# Patient Record
Sex: FEMALE | Race: BLACK OR AFRICAN AMERICAN | NOT HISPANIC OR LATINO | URBAN - METROPOLITAN AREA
[De-identification: names, ages, dates, MRNs, and addresses within clinical notes are randomized per-mention and may not be internally consistent; named-entity substitution may affect disease eponyms.]

---

## 2019-03-11 ENCOUNTER — OUTPATIENT (OUTPATIENT)
Dept: OUTPATIENT SERVICES | Facility: HOSPITAL | Age: 84
LOS: 1 days | Discharge: HOME | End: 2019-03-11

## 2019-03-11 DIAGNOSIS — Z96.649 PRESENCE OF UNSPECIFIED ARTIFICIAL HIP JOINT: Chronic | ICD-10-CM

## 2019-03-11 LAB
ANION GAP SERPL CALC-SCNC: 12 MMO/L — SIGNIFICANT CHANGE UP (ref 7–14)
BUN SERPL-MCNC: 26 MG/DL — HIGH (ref 7–23)
CALCIUM SERPL-MCNC: 10.4 MG/DL — SIGNIFICANT CHANGE UP (ref 8.4–10.5)
CHLORIDE SERPL-SCNC: 105 MMOL/L — SIGNIFICANT CHANGE UP (ref 98–107)
CO2 SERPL-SCNC: 26 MMOL/L — SIGNIFICANT CHANGE UP (ref 22–31)
CREAT SERPL-MCNC: 1.28 MG/DL — SIGNIFICANT CHANGE UP (ref 0.5–1.3)
GLUCOSE SERPL-MCNC: 103 MG/DL — HIGH (ref 70–99)
HCT VFR BLD CALC: 34.4 % — LOW (ref 34.5–45)
HGB BLD-MCNC: 11.1 G/DL — LOW (ref 11.5–15.5)
MCHC RBC-ENTMCNC: 32.3 % — SIGNIFICANT CHANGE UP (ref 32–36)
MCHC RBC-ENTMCNC: 33.4 PG — SIGNIFICANT CHANGE UP (ref 27–34)
MCV RBC AUTO: 103.6 FL — HIGH (ref 80–100)
NRBC # FLD: 0 K/UL — LOW (ref 25–125)
PLATELET # BLD AUTO: 229 K/UL — SIGNIFICANT CHANGE UP (ref 150–400)
PMV BLD: 9.5 FL — SIGNIFICANT CHANGE UP (ref 7–13)
POTASSIUM SERPL-MCNC: 5 MMOL/L — SIGNIFICANT CHANGE UP (ref 3.5–5.3)
POTASSIUM SERPL-SCNC: 5 MMOL/L — SIGNIFICANT CHANGE UP (ref 3.5–5.3)
RBC # BLD: 3.32 M/UL — LOW (ref 3.8–5.2)
RBC # FLD: 13.2 % — SIGNIFICANT CHANGE UP (ref 10.3–14.5)
SODIUM SERPL-SCNC: 143 MMOL/L — SIGNIFICANT CHANGE UP (ref 135–145)
WBC # BLD: 5.38 K/UL — SIGNIFICANT CHANGE UP (ref 3.8–10.5)
WBC # FLD AUTO: 5.38 K/UL — SIGNIFICANT CHANGE UP (ref 3.8–10.5)

## 2019-03-11 RX ORDER — SODIUM CHLORIDE 9 MG/ML
3 INJECTION INTRAMUSCULAR; INTRAVENOUS; SUBCUTANEOUS EVERY 8 HOURS
Qty: 0 | Refills: 0 | Status: DISCONTINUED | OUTPATIENT
Start: 2019-03-11 | End: 2019-03-26

## 2019-03-11 NOTE — H&P CARDIOLOGY - PMH
CKD (chronic kidney disease) stage 3, GFR 30-59 ml/min    HTN (hypertension), benign    PVD (peripheral vascular disease)

## 2019-03-11 NOTE — H&P CARDIOLOGY - RS GEN PE MLT RESP DETAILS PC
good air movement/airway patent/no chest wall tenderness/respirations non-labored/clear to auscultation bilaterally/breath sounds equal

## 2019-03-11 NOTE — H&P CARDIOLOGY - HISTORY OF PRESENT ILLNESS
88 y/o F w/ PMH of HTN, Hypothyroidism, b/l lower extremities weakness (2013), right hip replacement (2013), prediabetes, CKD stage 3, CAD s/p stent, pHTN, Severe TR and PAD presents for left lower extremity angiogram 88 y/o F w/ PMH of HTN, HLD, Hypothyroidism, b/l lower extremities weakness (2013), right hip replacement (2013), prediabetes, CKD stage 3, CAD s/p stent, pHTN, Severe TR and PAD presents for left lower extremity angiogram. Pt states she experiences bilateral lower extremity pain and weakness which limits her activity. Pt has known bilateral lower extremity severe SFA PAD and is s/p failed intervention to left SFA. 86 y/o F w/ PMH of HTN, HLD, Hypothyroidism, b/l lower extremities weakness (2013), right hip replacement (2013), prediabetes, CKD stage 3, CAD s/p stent, pHTN, Severe TR and PAD presents for left lower extremity angiogram. Pt states she experiences bilateral lower extremity weakness which limits her activity. Pt has known bilateral severe SFA PAD and is s/p failed intervention to left SFA. Pt denies N/V/D, fevers, chills, cough, palpitations, chest pain, substernal distress, syncope, dyspnea on exertion, orthopnea, nocturnal paroxysmal dyspnea, edema, cyanosis, heart murmurs, varicosities, phlebitis. 86 y/o F w/ PMH of HTN, HLD, Hypothyroidism, b/l lower extremities weakness (2013), right hip replacement (2013), prediabetes, CKD stage 3, CAD s/p stent, pHTN, Severe TR and PAD presents for right lower extremity angiogram. Pt states she experiences bilateral lower extremity weakness which limits her activity. Pt has known bilateral severe SFA PAD and is s/p failed intervention to left SFA. Pt denies N/V/D, fevers, chills, cough, palpitations, chest pain, substernal distress, syncope, dyspnea on exertion, orthopnea, nocturnal paroxysmal dyspnea, edema, cyanosis, heart murmurs, varicosities, phlebitis.

## 2019-03-11 NOTE — H&P CARDIOLOGY - NEGATIVE CARDIOVASCULAR SYMPTOMS
no dyspnea on exertion/no orthopnea/no peripheral edema/no paroxysmal nocturnal dyspnea/no chest pain/no palpitations

## 2021-01-26 ENCOUNTER — INPATIENT (INPATIENT)
Facility: HOSPITAL | Age: 86
LOS: 0 days | Discharge: ANOTHER HOSPITAL | End: 2021-01-27
Attending: INTERNAL MEDICINE | Admitting: INTERNAL MEDICINE
Payer: MEDICARE

## 2021-01-26 VITALS
HEIGHT: 64 IN | DIASTOLIC BLOOD PRESSURE: 60 MMHG | OXYGEN SATURATION: 95 % | TEMPERATURE: 98 F | WEIGHT: 100.09 LBS | SYSTOLIC BLOOD PRESSURE: 135 MMHG | RESPIRATION RATE: 16 BRPM | HEART RATE: 76 BPM

## 2021-01-26 DIAGNOSIS — Z96.649 PRESENCE OF UNSPECIFIED ARTIFICIAL HIP JOINT: Chronic | ICD-10-CM

## 2021-01-26 LAB
ALBUMIN SERPL ELPH-MCNC: 2.9 G/DL — LOW (ref 3.3–5)
ALP SERPL-CCNC: 58 U/L — SIGNIFICANT CHANGE UP (ref 40–120)
ALT FLD-CCNC: 12 U/L — SIGNIFICANT CHANGE UP (ref 12–78)
ANION GAP SERPL CALC-SCNC: 15 MMOL/L — SIGNIFICANT CHANGE UP (ref 5–17)
APPEARANCE UR: CLEAR — SIGNIFICANT CHANGE UP
APTT BLD: 28.6 SEC — SIGNIFICANT CHANGE UP (ref 27.5–35.5)
AST SERPL-CCNC: 30 U/L — SIGNIFICANT CHANGE UP (ref 15–37)
BACTERIA # UR AUTO: ABNORMAL
BASOPHILS # BLD AUTO: 0 K/UL — SIGNIFICANT CHANGE UP (ref 0–0.2)
BASOPHILS NFR BLD AUTO: 0 % — SIGNIFICANT CHANGE UP (ref 0–2)
BILIRUB SERPL-MCNC: 0.7 MG/DL — SIGNIFICANT CHANGE UP (ref 0.2–1.2)
BILIRUB UR-MCNC: NEGATIVE — SIGNIFICANT CHANGE UP
BUN SERPL-MCNC: 45 MG/DL — HIGH (ref 7–23)
CALCIUM SERPL-MCNC: 9.4 MG/DL — SIGNIFICANT CHANGE UP (ref 8.5–10.1)
CHLORIDE SERPL-SCNC: 109 MMOL/L — HIGH (ref 96–108)
CK MB BLD-MCNC: 4.5 % — HIGH (ref 0–3.5)
CK MB CFR SERPL CALC: 3 NG/ML — SIGNIFICANT CHANGE UP (ref 0.5–3.6)
CK SERPL-CCNC: 66 U/L — SIGNIFICANT CHANGE UP (ref 26–192)
CO2 SERPL-SCNC: 19 MMOL/L — LOW (ref 22–31)
COLOR SPEC: YELLOW — SIGNIFICANT CHANGE UP
CREAT SERPL-MCNC: 1.46 MG/DL — HIGH (ref 0.5–1.3)
DIFF PNL FLD: ABNORMAL
EOSINOPHIL # BLD AUTO: 0 K/UL — SIGNIFICANT CHANGE UP (ref 0–0.5)
EOSINOPHIL NFR BLD AUTO: 0 % — SIGNIFICANT CHANGE UP (ref 0–6)
EPI CELLS # UR: SIGNIFICANT CHANGE UP
GLUCOSE SERPL-MCNC: 116 MG/DL — HIGH (ref 70–99)
GLUCOSE UR QL: NEGATIVE MG/DL — SIGNIFICANT CHANGE UP
HCT VFR BLD CALC: 33.3 % — LOW (ref 34.5–45)
HGB BLD-MCNC: 10.4 G/DL — LOW (ref 11.5–15.5)
IMM GRANULOCYTES NFR BLD AUTO: 0.2 % — SIGNIFICANT CHANGE UP (ref 0–1.5)
INR BLD: 1.22 RATIO — HIGH (ref 0.88–1.16)
KETONES UR-MCNC: ABNORMAL
LACTATE SERPL-SCNC: 1.9 MMOL/L — SIGNIFICANT CHANGE UP (ref 0.7–2)
LACTATE SERPL-SCNC: 2.4 MMOL/L — HIGH (ref 0.7–2)
LEUKOCYTE ESTERASE UR-ACNC: ABNORMAL
LYMPHOCYTES # BLD AUTO: 0.36 K/UL — LOW (ref 1–3.3)
LYMPHOCYTES # BLD AUTO: 5.8 % — LOW (ref 13–44)
MCHC RBC-ENTMCNC: 31.2 GM/DL — LOW (ref 32–36)
MCHC RBC-ENTMCNC: 31.7 PG — SIGNIFICANT CHANGE UP (ref 27–34)
MCV RBC AUTO: 101.5 FL — HIGH (ref 80–100)
MONOCYTES # BLD AUTO: 0.25 K/UL — SIGNIFICANT CHANGE UP (ref 0–0.9)
MONOCYTES NFR BLD AUTO: 4 % — SIGNIFICANT CHANGE UP (ref 2–14)
NEUTROPHILS # BLD AUTO: 5.64 K/UL — SIGNIFICANT CHANGE UP (ref 1.8–7.4)
NEUTROPHILS NFR BLD AUTO: 90 % — HIGH (ref 43–77)
NITRITE UR-MCNC: NEGATIVE — SIGNIFICANT CHANGE UP
NRBC # BLD: 0 /100 WBCS — SIGNIFICANT CHANGE UP (ref 0–0)
PH UR: 5 — SIGNIFICANT CHANGE UP (ref 5–8)
PLATELET # BLD AUTO: 275 K/UL — SIGNIFICANT CHANGE UP (ref 150–400)
POTASSIUM SERPL-MCNC: 4.6 MMOL/L — SIGNIFICANT CHANGE UP (ref 3.5–5.3)
POTASSIUM SERPL-SCNC: 4.6 MMOL/L — SIGNIFICANT CHANGE UP (ref 3.5–5.3)
PROT SERPL-MCNC: 7.9 GM/DL — SIGNIFICANT CHANGE UP (ref 6–8.3)
PROT UR-MCNC: 30 MG/DL
PROTHROM AB SERPL-ACNC: 14 SEC — HIGH (ref 10.6–13.6)
RBC # BLD: 3.28 M/UL — LOW (ref 3.8–5.2)
RBC # FLD: 14.7 % — HIGH (ref 10.3–14.5)
RBC CASTS # UR COMP ASSIST: SIGNIFICANT CHANGE UP /HPF (ref 0–4)
SODIUM SERPL-SCNC: 143 MMOL/L — SIGNIFICANT CHANGE UP (ref 135–145)
SP GR SPEC: 1.02 — SIGNIFICANT CHANGE UP (ref 1.01–1.02)
TROPONIN I SERPL-MCNC: <.015 NG/ML — SIGNIFICANT CHANGE UP (ref 0.01–0.04)
TSH SERPL-MCNC: 8.63 UIU/ML — HIGH (ref 0.36–3.74)
UROBILINOGEN FLD QL: NEGATIVE MG/DL — SIGNIFICANT CHANGE UP
WBC # BLD: 6.26 K/UL — SIGNIFICANT CHANGE UP (ref 3.8–10.5)
WBC # FLD AUTO: 6.26 K/UL — SIGNIFICANT CHANGE UP (ref 3.8–10.5)
WBC UR QL: SIGNIFICANT CHANGE UP

## 2021-01-26 PROCEDURE — 99284 EMERGENCY DEPT VISIT MOD MDM: CPT

## 2021-01-26 PROCEDURE — 71045 X-RAY EXAM CHEST 1 VIEW: CPT | Mod: 26

## 2021-01-26 PROCEDURE — 70450 CT HEAD/BRAIN W/O DYE: CPT | Mod: 26,MA

## 2021-01-26 PROCEDURE — 74177 CT ABD & PELVIS W/CONTRAST: CPT | Mod: 26,MA

## 2021-01-26 PROCEDURE — 71250 CT THORAX DX C-: CPT | Mod: 26,MA

## 2021-01-26 RX ORDER — SODIUM CHLORIDE 9 MG/ML
1000 INJECTION INTRAMUSCULAR; INTRAVENOUS; SUBCUTANEOUS ONCE
Refills: 0 | Status: COMPLETED | OUTPATIENT
Start: 2021-01-26 | End: 2021-01-26

## 2021-01-26 RX ORDER — SODIUM CHLORIDE 9 MG/ML
500 INJECTION INTRAMUSCULAR; INTRAVENOUS; SUBCUTANEOUS ONCE
Refills: 0 | Status: COMPLETED | OUTPATIENT
Start: 2021-01-26 | End: 2021-01-26

## 2021-01-26 RX ADMIN — SODIUM CHLORIDE 500 MILLILITER(S): 9 INJECTION INTRAMUSCULAR; INTRAVENOUS; SUBCUTANEOUS at 22:16

## 2021-01-26 RX ADMIN — SODIUM CHLORIDE 1000 MILLILITER(S): 9 INJECTION INTRAMUSCULAR; INTRAVENOUS; SUBCUTANEOUS at 20:57

## 2021-01-26 NOTE — ED PROVIDER NOTE - CONSTITUTIONAL, MLM
normal... thin, cachetic appearing awake, alert, oriented to person, place, time/situation and in no apparent distress.

## 2021-01-26 NOTE — ED PROVIDER NOTE - SECONDARY DIAGNOSIS.
COVID-19 Failure to thrive in adult Urinary tract infection without hematuria, site unspecified Weakness

## 2021-01-26 NOTE — ED PROVIDER NOTE - NS ED MD DISPO SPECIAL CONSIDERATION1
Fall Precaution/Advanced Illness/Geriatrics/Frailty/Confirmed COVID-19 Fall Precaution/Advanced Illness/Geriatrics/Frailty

## 2021-01-26 NOTE — ED PROVIDER NOTE - CARE PLAN
Principal Discharge DX:	Mucinous adenocarcinoma  Secondary Diagnosis:	Urinary tract infection without hematuria, site unspecified  Secondary Diagnosis:	Weakness  Secondary Diagnosis:	Failure to thrive in adult  Secondary Diagnosis:	COVID-19   Principal Discharge DX:	Mucinous adenocarcinoma  Secondary Diagnosis:	Urinary tract infection without hematuria, site unspecified  Secondary Diagnosis:	Weakness  Secondary Diagnosis:	Failure to thrive in adult

## 2021-01-26 NOTE — ED PROVIDER NOTE - CADM POA PRESS ULCER
Subjective:    Patient ID:  Kamila Dobbs is a 68 y.o. female who presents for follow-up of Results      HPI     HTN,HLD,DM, palpitations, Mild-mod MR/AI     Echo 11/16/17    1 - Normal left ventricular systolic function (EF 55-60%).     2 - Mild to moderate mitral regurgitation.     3 - Mild AI and mild TR     Stress test 11/16/17  LVEF: 66 %  Impression: NORMAL MYOCARDIAL PERFUSION  1. The perfusion scan is free of evidence for myocardial ischemia or injury.   2. There is a mild intensity fixed defect in the anterior wall of the left ventricle, secondary to breast attenuation.   3. Resting wall motion is physiologic.   4. Resting LV function is normal.   5. The ventricular volumes are normal at rest and stress.   6. The extracardiac distribution of radioactivity is normal.   7. When compared to the previous study from 09/16/2015, no significant change.    Denies CP or SOB       Review of Systems   Constitution: Negative for decreased appetite.   HENT: Negative for ear discharge.    Eyes: Negative for blurred vision.   Respiratory: Negative for hemoptysis.    Endocrine: Negative for polyphagia.   Hematologic/Lymphatic: Negative for adenopathy.   Skin: Negative for color change.   Musculoskeletal: Negative for joint swelling.   Neurological: Negative for brief paralysis.   Psychiatric/Behavioral: Negative for hallucinations.        Objective:    Physical Exam   Constitutional: She is oriented to person, place, and time. She appears well-developed and well-nourished.   HENT:   Head: Normocephalic and atraumatic.   Eyes: Conjunctivae are normal. Pupils are equal, round, and reactive to light.   Neck: Normal range of motion. Neck supple.   Cardiovascular: Normal rate, normal heart sounds and intact distal pulses.    Pulmonary/Chest: Effort normal and breath sounds normal.   Abdominal: Soft. Bowel sounds are normal.   Musculoskeletal: Normal range of motion.   Neurological: She is alert and oriented to person,  place, and time.   Skin: Skin is warm and dry.         Assessment:       1. Chest pain, atypical    2. CANAS (dyspnea on exertion)    3. Dizziness    4. Diabetic nephropathy associated with type 2 diabetes mellitus    5. HTN (hypertension), benign    6. Hyperlipidemia, unspecified hyperlipidemia type    7. Palpitations    8. Mitral valve insufficiency, unspecified etiology         Plan:       Cardiac stable  OV 6 months             No

## 2021-01-26 NOTE — ED ADULT NURSE NOTE - OBJECTIVE STATEMENT
pt a&O x3  pt poor historian, pt c.o left leg weakness x 4 weeks H/O DVT 2019 . pt states use cane , no recent falls. PT states poor appetite. NO pain on urination, no dizziness, chest pain, no shortness of breath. pt sounds mumbled slurred speech, left hand grasp greater. no arm drift, no arm weakness, no leg drift

## 2021-01-26 NOTE — ED PROVIDER NOTE - PROGRESS NOTE DETAILS
signout received: NYLA BUCKNER: 88 y/o F w/ PMH of HTN, HLD, Hypothyroidism, b/l lower extremities weakness (2013), right hip replacement (2013), prediabetes, CKD stage 3, CAD s/p stent, pHTN, Severe TR and PAD presenting with weakness, to be admitted for CT AP (+) widespread carcinomatosis, generalized weakness, (+) UTI, mild KHLOE (no baseline in EMR), lactate 2.4 which cleared, elevated TSH 8.63 (hx of hypothyroidism, probably undertreated?). CTH negative, CT chest w/ small b/l pleural effusions.

## 2021-01-26 NOTE — ED PROVIDER NOTE - OBJECTIVE STATEMENT
89 year old female with h/o HTN, CKD and PVD presents today c/o bilateral lower extremity weakness chronic for the last four weeks, she describes her pain as intermittent and cramping and worsening  (-) traumm (-) leg edema (-) like illness +weight loss

## 2021-01-26 NOTE — ED PROVIDER NOTE - CLINICAL SUMMARY MEDICAL DECISION MAKING FREE TEXT BOX
pt presented today with leg pains and weakness, also reports weight loss, appears thin and cachetic, lab, ekg, ct pending, case signed out to the oncoming physician

## 2021-01-26 NOTE — ED ADULT NURSE REASSESSMENT NOTE - NS ED NURSE REASSESS COMMENT FT1
Received report from DANIELLA Portillo. pt on the bed alert and oriented. pt identified self introduced. hooked to cardiac monitor. safety measures checked.

## 2021-01-27 ENCOUNTER — INPATIENT (INPATIENT)
Facility: HOSPITAL | Age: 86
LOS: 2 days | Discharge: HOME HEALTH | DRG: 754 | End: 2021-01-30
Attending: INTERNAL MEDICINE | Admitting: INTERNAL MEDICINE
Payer: COMMERCIAL

## 2021-01-27 VITALS
HEIGHT: 62 IN | SYSTOLIC BLOOD PRESSURE: 131 MMHG | DIASTOLIC BLOOD PRESSURE: 79 MMHG | OXYGEN SATURATION: 99 % | HEART RATE: 66 BPM | TEMPERATURE: 99 F | WEIGHT: 101.41 LBS | RESPIRATION RATE: 14 BRPM

## 2021-01-27 VITALS
OXYGEN SATURATION: 100 % | DIASTOLIC BLOOD PRESSURE: 71 MMHG | HEART RATE: 81 BPM | RESPIRATION RATE: 17 BRPM | SYSTOLIC BLOOD PRESSURE: 128 MMHG | TEMPERATURE: 98 F

## 2021-01-27 DIAGNOSIS — Z29.9 ENCOUNTER FOR PROPHYLACTIC MEASURES, UNSPECIFIED: ICD-10-CM

## 2021-01-27 DIAGNOSIS — C80.1 MALIGNANT (PRIMARY) NEOPLASM, UNSPECIFIED: ICD-10-CM

## 2021-01-27 DIAGNOSIS — R73.03 PREDIABETES: ICD-10-CM

## 2021-01-27 DIAGNOSIS — E03.9 HYPOTHYROIDISM, UNSPECIFIED: ICD-10-CM

## 2021-01-27 DIAGNOSIS — R62.7 ADULT FAILURE TO THRIVE: ICD-10-CM

## 2021-01-27 DIAGNOSIS — N18.30 CHRONIC KIDNEY DISEASE, STAGE 3 UNSPECIFIED: ICD-10-CM

## 2021-01-27 DIAGNOSIS — D53.9 NUTRITIONAL ANEMIA, UNSPECIFIED: ICD-10-CM

## 2021-01-27 DIAGNOSIS — R19.00 INTRA-ABDOMINAL AND PELVIC SWELLING, MASS AND LUMP, UNSPECIFIED SITE: ICD-10-CM

## 2021-01-27 DIAGNOSIS — Z96.649 PRESENCE OF UNSPECIFIED ARTIFICIAL HIP JOINT: Chronic | ICD-10-CM

## 2021-01-27 DIAGNOSIS — I10 ESSENTIAL (PRIMARY) HYPERTENSION: ICD-10-CM

## 2021-01-27 DIAGNOSIS — R53.1 WEAKNESS: ICD-10-CM

## 2021-01-27 DIAGNOSIS — N39.0 URINARY TRACT INFECTION, SITE NOT SPECIFIED: ICD-10-CM

## 2021-01-27 DIAGNOSIS — N28.9 DISORDER OF KIDNEY AND URETER, UNSPECIFIED: ICD-10-CM

## 2021-01-27 DIAGNOSIS — I73.9 PERIPHERAL VASCULAR DISEASE, UNSPECIFIED: ICD-10-CM

## 2021-01-27 LAB
ALBUMIN SERPL ELPH-MCNC: 2.8 G/DL — LOW (ref 3.3–5)
ALP SERPL-CCNC: 54 U/L — SIGNIFICANT CHANGE UP (ref 40–120)
ALT FLD-CCNC: 10 U/L — LOW (ref 12–78)
ANION GAP SERPL CALC-SCNC: 9 MMOL/L — SIGNIFICANT CHANGE UP (ref 5–17)
AST SERPL-CCNC: 33 U/L — SIGNIFICANT CHANGE UP (ref 15–37)
BASOPHILS # BLD AUTO: 0 K/UL — SIGNIFICANT CHANGE UP (ref 0–0.2)
BASOPHILS NFR BLD AUTO: 0 % — SIGNIFICANT CHANGE UP (ref 0–2)
BILIRUB SERPL-MCNC: 0.5 MG/DL — SIGNIFICANT CHANGE UP (ref 0.2–1.2)
BUN SERPL-MCNC: 42 MG/DL — HIGH (ref 7–23)
CALCIUM SERPL-MCNC: 9 MG/DL — SIGNIFICANT CHANGE UP (ref 8.5–10.1)
CHLORIDE SERPL-SCNC: 114 MMOL/L — HIGH (ref 96–108)
CO2 SERPL-SCNC: 21 MMOL/L — LOW (ref 22–31)
CREAT SERPL-MCNC: 1.36 MG/DL — HIGH (ref 0.5–1.3)
EOSINOPHIL # BLD AUTO: 0 K/UL — SIGNIFICANT CHANGE UP (ref 0–0.5)
EOSINOPHIL NFR BLD AUTO: 0 % — SIGNIFICANT CHANGE UP (ref 0–6)
FERRITIN SERPL-MCNC: 812 NG/ML — HIGH (ref 15–150)
FOLATE SERPL-MCNC: 13.9 NG/ML — SIGNIFICANT CHANGE UP
GLUCOSE SERPL-MCNC: 100 MG/DL — HIGH (ref 70–99)
HCT VFR BLD CALC: 33.7 % — LOW (ref 34.5–45)
HGB BLD-MCNC: 10.8 G/DL — LOW (ref 11.5–15.5)
IMM GRANULOCYTES NFR BLD AUTO: 0.3 % — SIGNIFICANT CHANGE UP (ref 0–1.5)
IRON SATN MFR SERPL: 18 % — SIGNIFICANT CHANGE UP (ref 14–50)
IRON SATN MFR SERPL: 29 UG/DL — LOW (ref 30–160)
LYMPHOCYTES # BLD AUTO: 0.73 K/UL — LOW (ref 1–3.3)
LYMPHOCYTES # BLD AUTO: 12.6 % — LOW (ref 13–44)
MAGNESIUM SERPL-MCNC: 2.4 MG/DL — SIGNIFICANT CHANGE UP (ref 1.6–2.6)
MCHC RBC-ENTMCNC: 32 GM/DL — SIGNIFICANT CHANGE UP (ref 32–36)
MCHC RBC-ENTMCNC: 32.4 PG — SIGNIFICANT CHANGE UP (ref 27–34)
MCV RBC AUTO: 101.2 FL — HIGH (ref 80–100)
MONOCYTES # BLD AUTO: 0.25 K/UL — SIGNIFICANT CHANGE UP (ref 0–0.9)
MONOCYTES NFR BLD AUTO: 4.3 % — SIGNIFICANT CHANGE UP (ref 2–14)
NEUTROPHILS # BLD AUTO: 4.78 K/UL — SIGNIFICANT CHANGE UP (ref 1.8–7.4)
NEUTROPHILS NFR BLD AUTO: 82.8 % — HIGH (ref 43–77)
NRBC # BLD: 0 /100 WBCS — SIGNIFICANT CHANGE UP (ref 0–0)
PHOSPHATE SERPL-MCNC: 3.6 MG/DL — SIGNIFICANT CHANGE UP (ref 2.5–4.5)
PLATELET # BLD AUTO: 273 K/UL — SIGNIFICANT CHANGE UP (ref 150–400)
POTASSIUM SERPL-MCNC: 4.4 MMOL/L — SIGNIFICANT CHANGE UP (ref 3.5–5.3)
POTASSIUM SERPL-SCNC: 4.4 MMOL/L — SIGNIFICANT CHANGE UP (ref 3.5–5.3)
PROT SERPL-MCNC: 7.8 GM/DL — SIGNIFICANT CHANGE UP (ref 6–8.3)
RAPID RVP RESULT: SIGNIFICANT CHANGE UP
RBC # BLD: 3.33 M/UL — LOW (ref 3.8–5.2)
RBC # FLD: 14.9 % — HIGH (ref 10.3–14.5)
SARS-COV-2 IGG SERPL QL IA: NEGATIVE — SIGNIFICANT CHANGE UP
SARS-COV-2 IGM SERPL IA-ACNC: 0.08 INDEX — SIGNIFICANT CHANGE UP
SARS-COV-2 RNA SPEC QL NAA+PROBE: SIGNIFICANT CHANGE UP
SODIUM SERPL-SCNC: 144 MMOL/L — SIGNIFICANT CHANGE UP (ref 135–145)
T4 FREE SERPL-MCNC: 0.9 NG/DL — SIGNIFICANT CHANGE UP (ref 0.9–1.8)
TIBC SERPL-MCNC: 158 UG/DL — LOW (ref 220–430)
UIBC SERPL-MCNC: 128 UG/DL — SIGNIFICANT CHANGE UP (ref 110–370)
VIT B12 SERPL-MCNC: >2000 PG/ML — HIGH (ref 232–1245)
WBC # BLD: 5.78 K/UL — SIGNIFICANT CHANGE UP (ref 3.8–10.5)
WBC # FLD AUTO: 5.78 K/UL — SIGNIFICANT CHANGE UP (ref 3.8–10.5)

## 2021-01-27 PROCEDURE — 12345: CPT | Mod: NC

## 2021-01-27 PROCEDURE — 99223 1ST HOSP IP/OBS HIGH 75: CPT

## 2021-01-27 RX ORDER — ONDANSETRON 8 MG/1
4 TABLET, FILM COATED ORAL EVERY 6 HOURS
Refills: 0 | Status: DISCONTINUED | OUTPATIENT
Start: 2021-01-27 | End: 2021-01-27

## 2021-01-27 RX ORDER — SODIUM CHLORIDE 9 MG/ML
1000 INJECTION, SOLUTION INTRAVENOUS
Refills: 0 | Status: DISCONTINUED | OUTPATIENT
Start: 2021-01-27 | End: 2021-01-29

## 2021-01-27 RX ORDER — ACETAMINOPHEN 500 MG
650 TABLET ORAL EVERY 6 HOURS
Refills: 0 | Status: DISCONTINUED | OUTPATIENT
Start: 2021-01-27 | End: 2021-01-27

## 2021-01-27 RX ORDER — PREGABALIN 225 MG/1
0 CAPSULE ORAL
Qty: 0 | Refills: 9 | DISCHARGE

## 2021-01-27 RX ORDER — LEVOTHYROXINE SODIUM 125 MCG
25 TABLET ORAL DAILY
Refills: 0 | Status: DISCONTINUED | OUTPATIENT
Start: 2021-01-27 | End: 2021-01-30

## 2021-01-27 RX ORDER — CEFTRIAXONE 500 MG/1
1000 INJECTION, POWDER, FOR SOLUTION INTRAMUSCULAR; INTRAVENOUS ONCE
Refills: 0 | Status: COMPLETED | OUTPATIENT
Start: 2021-01-27 | End: 2021-01-27

## 2021-01-27 RX ORDER — ASPIRIN/CALCIUM CARB/MAGNESIUM 324 MG
81 TABLET ORAL DAILY
Refills: 0 | Status: DISCONTINUED | OUTPATIENT
Start: 2021-01-27 | End: 2021-01-27

## 2021-01-27 RX ORDER — LEVOTHYROXINE SODIUM 125 MCG
25 TABLET ORAL DAILY
Refills: 0 | Status: DISCONTINUED | OUTPATIENT
Start: 2021-01-27 | End: 2021-01-27

## 2021-01-27 RX ORDER — CEFTRIAXONE 500 MG/1
1000 INJECTION, POWDER, FOR SOLUTION INTRAMUSCULAR; INTRAVENOUS EVERY 24 HOURS
Refills: 0 | Status: DISCONTINUED | OUTPATIENT
Start: 2021-01-27 | End: 2021-01-27

## 2021-01-27 RX ORDER — AMLODIPINE BESYLATE 2.5 MG/1
5 TABLET ORAL DAILY
Refills: 0 | Status: DISCONTINUED | OUTPATIENT
Start: 2021-01-27 | End: 2021-01-30

## 2021-01-27 RX ORDER — SIMVASTATIN 20 MG/1
1 TABLET, FILM COATED ORAL
Qty: 0 | Refills: 0 | DISCHARGE

## 2021-01-27 RX ORDER — AMLODIPINE BESYLATE 2.5 MG/1
5 TABLET ORAL DAILY
Refills: 0 | Status: DISCONTINUED | OUTPATIENT
Start: 2021-01-27 | End: 2021-01-27

## 2021-01-27 RX ORDER — ASPIRIN/CALCIUM CARB/MAGNESIUM 324 MG
81 TABLET ORAL DAILY
Refills: 0 | Status: DISCONTINUED | OUTPATIENT
Start: 2021-01-27 | End: 2021-01-30

## 2021-01-27 RX ORDER — HEPARIN SODIUM 5000 [USP'U]/ML
5000 INJECTION INTRAVENOUS; SUBCUTANEOUS THREE TIMES A DAY
Refills: 0 | Status: DISCONTINUED | OUTPATIENT
Start: 2021-01-27 | End: 2021-01-27

## 2021-01-27 RX ORDER — HEPARIN SODIUM 5000 [USP'U]/ML
5000 INJECTION INTRAVENOUS; SUBCUTANEOUS EVERY 12 HOURS
Refills: 0 | Status: DISCONTINUED | OUTPATIENT
Start: 2021-01-27 | End: 2021-01-30

## 2021-01-27 RX ADMIN — AMLODIPINE BESYLATE 5 MILLIGRAM(S): 2.5 TABLET ORAL at 15:54

## 2021-01-27 RX ADMIN — Medication 81 MILLIGRAM(S): at 15:54

## 2021-01-27 RX ADMIN — HEPARIN SODIUM 5000 UNIT(S): 5000 INJECTION INTRAVENOUS; SUBCUTANEOUS at 15:54

## 2021-01-27 RX ADMIN — CEFTRIAXONE 100 MILLIGRAM(S): 500 INJECTION, POWDER, FOR SOLUTION INTRAMUSCULAR; INTRAVENOUS at 01:21

## 2021-01-27 NOTE — H&P ADULT - ASSESSMENT
89 y/o F with PMH of HTN, Dyslipidemia, Pre DM, CAD s/p PCI, PVD, DVT in 2019, CKD stage 3, and Hypothyroidism presented with a newly diagnosed pelvic mass.

## 2021-01-27 NOTE — H&P ADULT - PROBLEM SELECTOR PLAN 7
TSH was 8.63 on January 26th as patient was not taking her Levothyroxine, and was just restarted on 25 mcg PO daily on admission, continue same dose, needs a f/u  TSH in 3 months as an outpatient.

## 2021-01-27 NOTE — H&P ADULT - PROBLEM SELECTOR PLAN 1
patient is aware of the mass, agreed to have paracentesis, scheduled for am, ordered  & CEA, please discuss with her daughter in [place] in am, may consider palliative care consult.

## 2021-01-27 NOTE — H&P ADULT - NSHPPHYSICALEXAM_GEN_ALL_CORE
PHYSICAL EXAM:    Vital Signs Last 24 Hrs  T(C): 36.7 (27 Jan 2021 02:11), Max: 36.8 (26 Jan 2021 22:36)  T(F): 98 (27 Jan 2021 02:11), Max: 98.2 (26 Jan 2021 22:36)  HR: 98 (27 Jan 2021 02:11) (76 - 98)  BP: 123/63 (27 Jan 2021 02:11) (123/63 - 141/100)  BP(mean): --  RR: 16 (27 Jan 2021 02:11) (16 - 18)  SpO2: 99% (27 Jan 2021 02:11) (95% - 99%)    GENERAL: Pt lying in bed comfortably in NAD  HEENT:  Atraumatic, EOMI, PERRL, conjunctiva and sclera clear, MMM  NECK: Supple, No JVD  CHEST/LUNG: Clear to auscultation bilaterally; No rales, rhonchi, wheezing or rubs. Unlabored respirations  HEART: Regular rate and rhythm; No murmurs, rubs, or gallops  ABDOMEN: Bowel sounds present; Soft, Nontender, Nondistended. No guarding or rigidity    EXTREMITIES:  2+ Peripheral Pulses, brisk capillary refill. No clubbing, cyanosis, or edema  NEUROLOGICAL:  Alert & Oriented X3, speech clear. Answers questions appropriately. Full and equal strength B/L upper and lower extremities. No deficits   MSK: FROM x 4 extremities   SKIN: No rashes or lesions PHYSICAL EXAM:    Vital Signs Last 24 Hrs  T(C): 36.7 (27 Jan 2021 02:11), Max: 36.8 (26 Jan 2021 22:36)  T(F): 98 (27 Jan 2021 02:11), Max: 98.2 (26 Jan 2021 22:36)  HR: 98 (27 Jan 2021 02:11) (76 - 98)  BP: 123/63 (27 Jan 2021 02:11) (123/63 - 141/100)  BP(mean): --  RR: 16 (27 Jan 2021 02:11) (16 - 18)  SpO2: 99% (27 Jan 2021 02:11) (95% - 99%)    GENERAL: Pt lying in bed comfortably, appears malnurished, thin/cachectic  HEENT:  Atraumatic, EOMI, PERRL, conjunctiva and sclera clear, dry MM  NECK: Supple  CHEST/LUNG: Clear to auscultation bilaterally Unlabored respirations  HEART: Regular rate and rhythm;  ABDOMEN: Bowel sounds present; Soft, Nontender, mid-mod distended. No guarding or rigidity    EXTREMITIES:  1+ Peripheral Pulses. No edema  NEUROLOGICAL:  Alert & Oriented X3, speech clear. Generalized weakness No focal deficits   MSK: FROM x 4 extremities   SKIN: No rashes or lesions

## 2021-01-27 NOTE — H&P ADULT - PROBLEM SELECTOR PLAN 4
- pt w/ worsening weakness and pain, DPs mildly diminished but present  - PT eval  - tylenol prn  - ASA

## 2021-01-27 NOTE — H&P ADULT - HISTORY OF PRESENT ILLNESS
This is an 89 y/o F with PMH of HTN, Dyslipidemia, Pre DM, CAD s/p PCI, PVD, DVT in 2019, CKD stage 3, and Hypothyroidism who was transferred from [facility] after she was admitted there for a newly diagnosed pelvic mass. Patient was experiencing anorexia & ongoing weight loss over the past 2 months, seen yesterday at [facility] for left leg pain & generalized weakness, CT abdomen & pelvis with contrast showed a large pelvic mass with extensive peritoneal carcinomatosis and large amount of ascites, seen by HEM/ONC there & was offered IR biopsy but she was still thinking, now she agrees to have it done, was transferred to [facility]. Currently she reports no pain, and denies having any symptoms.   This is an 89 y/o F with PMH of HTN, Dyslipidemia, Pre DM, CAD s/p PCI, PVD, DVT in 2019, CKD stage 3, and Hypothyroidism who was transferred from [facility] after she was admitted there for a newly diagnosed pelvic mass. Patient was experiencing anorexia & ongoing weight loss over the past 2 months, seen yesterday at [facility] for left leg pain & generalized weakness, CT abdomen & pelvis with contrast showed a large pelvic mass with extensive peritoneal carcinomatosis and large amount of ascites, seen by HEM/ONC there & was offered IR paracentesis but she was still thinking, now she agrees to have it done, was transferred to [facility]. Currently she reports no pain, and denies having any symptoms.

## 2021-01-27 NOTE — CONSULT NOTE ADULT - SUBJECTIVE AND OBJECTIVE BOX
Reason for Consultation: pelvic Mass    HPI: Patient is a 89y Female seen on consultatioin for the evaluation and management of Pelvic Mass            89 y/o female with PMHx of HTN, Hypothyroidism, chronic B/L lower extremities weakness (R>>L), s/p right hip replacement in , CKD stage 3, PVD s/p ? bypass/stent, CAD s/p 1 stent presents to the ED c/p worsening LE weakness (R>>L) for the past few weeks. Pt reports a "blockage" was found in her LE B/L, however L>>R, and is s/p surgery however pain has persisted intermittently and has worsened in the last few weeks. Pt describes pain as crampy, noted more in am after waking up. Pt also c/o generalized weakness for the past few weeks, ongoing unintentional wt loss for the past few months, increasingly "puffy" abdomen for the past few 3 weeks as well as loss of appetite, and decreased PO intake. Pt reports she reported her wt loss to PCP and was PCP suspected malignancy however no work up was done per pt, her medications were adjusted. Pt reports she was taken off all her medications except for ASA and Amlodipine. Pt reported a bout of nonbloody diarrhea a few weeks which resolved on its own however denies, N/V, or abdominal pain. Pt also reports change in color of her urine; darker, sediments in the last 2 weeks. Pt denies fever, chills, night sweats, or vaginal bleeding or hx of malignancy (was up to date on cancer screening until she aged-out)    In ED initial vitals stable. Labs sig for H/H 10.4/33.5, .5, LA 2.4, TSH 8.6, BUN/Cr 45/1.46, for rest of labs see below. UA +ve bacteriuria, CT A/P showed Very large infiltrative cystic and solid mass in the pelvis, with extensive peritoneal carcinomatosis and large volume diffuse ascites. 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 2. Small bilateral pleural effusions. Pt given Rocephin and IVF.       PAST MEDICAL & SURGICAL HISTORY:  PVD (peripheral vascular disease)    CKD (chronic kidney disease) stage 3, GFR 30-59 ml/min    HTN (hypertension), benign    Status post hip replacement, unspecified laterality      MEDICATIONS  (STANDING):  amLODIPine   Tablet 5 milliGRAM(s) Oral daily  aspirin enteric coated 81 milliGRAM(s) Oral daily  cefTRIAXone   IVPB 1000 milliGRAM(s) IV Intermittent every 24 hours  heparin   Injectable 5000 Unit(s) SubCutaneous three times a day  levothyroxine 25 MICROGram(s) Oral daily    MEDICATIONS  (PRN):  acetaminophen   Tablet .. 650 milliGRAM(s) Oral every 6 hours PRN Temp greater or equal to 38C (100.4F), Mild Pain (1 - 3)  ondansetron Injectable 4 milliGRAM(s) IV Push every 6 hours PRN Nausea and/or Vomiting      Allergies    No Known Allergies    Intolerances        SOCIAL HISTORY:    Smoking Status: no  Alcohol: no  Marital Status: widowded  Occupation: no    FAMILY HISTORY:        	    Vital Signs Last 24 Hrs  T(C): 36.7 (2021 06:25), Max: 36.8 (2021 22:36)  T(F): 98.1 (2021 06:25), Max: 98.2 (2021 22:36)  HR: 99 (2021 06:25) (76 - 99)  BP: 118/72 (2021 06:25) (118/72 - 141/100)  BP(mean): --  RR: 16 (2021 06:25) (16 - 18)  SpO2: 98% (2021 06:25) (95% - 99%)    PHYSICAL EXAM:    general - AAO x 3, cachetic  HEENT - No Icterus  CVS - RRR  RS - AE B/L  Abd - soft, NT  Ext - Pulses +        LABS:                        10.4   6.26  )-----------( 275      ( 2021 20:03 )             33.3         143  |  109<H>  |  45<H>  ----------------------------<  116<H>  4.6   |  19<L>  |  1.46<H>    Ca    9.4      2021 20:03    TPro  7.9  /  Alb  2.9<L>  /  TBili  0.7  /  DBili  x   /  AST  30  /  ALT  12  /  AlkPhos  58      PT/INR - ( 2021 20:03 )   PT: 14.0 sec;   INR: 1.22 ratio         PTT - ( 2021 20:03 )  PTT:28.6 sec  Urinalysis Basic - ( 2021 22:35 )    Color: Yellow / Appearance: Clear / S.020 / pH: x  Gluc: x / Ketone: Small  / Bili: Negative / Urobili: Negative mg/dL   Blood: x / Protein: 30 mg/dL / Nitrite: Negative   Leuk Esterase: Trace / RBC: 0-2 /HPF / WBC 3-5   Sq Epi: x / Non Sq Epi: Few / Bacteria: TNTC          RADIOLOGY & ADDITIONAL STUDIES:

## 2021-01-27 NOTE — PATIENT PROFILE ADULT - NSPROPTRIGHTBILLOFRIGHTS_GEN_A_NUR
Physical Therapy Daily Treatment    Visit Count: 14  Plan of Care: 1/11/2019 Through: 4/5/2019  Insurance Information: \"evaluation then 12 visits\" approved - Authorization required for any additional visits. \"Does not need authorization for up to 20 visits.\" - $75 co-pay   Referred by: Tejinder Foster MD; Next provider visit (if known/scheduled): 4/16/19  Medical Diagnosis (from order):  Decreased mobility [R26.89], History of stroke [Z86.73]     Date of onset/injury: 10/5/2018  Diagnosis Precautions: Expressive aphasia, thickened liquids, fearful of falling, use gait belt  Chart reviewed at time of initial evaluation (relevant co-morbidities, allergies, tests and medications listed): Cerebral aneurysm, expressive aphasia, History of left greater trochanteric bursitis, form smoker (up until onset of Cerebrovascular Accident)  History of Cerebrovascular Accident 10/5/2018 (Left corona radiata infarction)     SUBJECTIVE   Patient denies any pain. She and spouse deny any falls.   Pain (0-10 scale): 0  Patient's spouse Ja present throughout therapy session.     OBJECTIVE     Appears steady, though she reaches out and grabs     Treatment   Wearing gait belt for safety    Therapeutic Exercise:   Exercise Repetitions Sets Position/Cues   NuStep, Level 2 5 minutes   Lower extremities only -   Still requires cues and encouragement to move her legs faster. Instructed to keep between 20-30 steps per minute   Sit to stands from NuStep  5 1 Using hands against thighs, drastically improved with rising with cues for \"forward\" shift, with minimal to no assist required. Improves with repetitions       Gait Training:   Gait training with 2 wheeled walker and wheelchair follow 9 feet from NuStep to Balance Manager, then 35 feet from Balance Manager toward gym exit. Verbal cues (\"like you're marching over the hurdles\") and occasional manual assist to encourage weight shift over left lower extremity during single limb stance to allow  for better right foot clearance and advancement. Improved weight shift and more consistent right foot advancement with practice.     Also performed 2 step ups, about 2 inches then 4 inches, to get up into Balance Manager, requiring verbal cues and minimal assist for pattern (up with good, down with bad).     Balance Manager Static Standing:   Target Placement    Limits of Stability   Surround     Support     Time Setting     Number of trials   1 Align center 0% None / Fixed None / Fixed 2 minutes 1   2 Align center,   With head turns 0% None / Fixed None / Fixed 2 minutes 1   3 Align center,  With head nods 0% None / Fixed None / Fixed 2 minutes 1   4 Align center 0% None / Fixed 20%  / Random 2 minutes 2   Optional settings used: Trace  Grid  Curser  Comments: Extensive verbal and tactile cues for weight shift backward and to the left to maintain center.     Home Program:   Access Code: [email or web address]  1/11/19: Heel raises, Marches, Long arc quadriceps with 5 second holds, hip abduction with red theraband, Hip adduction with pillow   3/5/19: formal walking program, as her performance of this at home has decreased       Suggestions for next session as indicated: progress per plan of care  NuStep warm up  Progress lower extremity strengthening and balance training   Update home exercise program as able - add side stepping   Gait training - forward, retro, side stepping, turns   Assess compliance and response to walking program   Balance training - Balance Manager     ASSESSMENT   Patient demonstrates improving performance of warm up, with more time spent above 20 steps per minute this session than last session. Performed balance training in the Balance Manager, and patient was able to visualize that she wasn't shifting much during static balance tasks, especially because her brain is still miscommunicating the limits of stability, making her feel that she is moving a lot more than she actually is.   Patient doesn't really  like the Balance Manager either; so therapist will provide patient with choice of either Balance Manager or doing what we did before, but we need to work on her balance control, since this is a big fear and deficit for her.   Pain after treatment (patient reported, 0-10 scale): 0  Result of above outlined education: Verbalizes understanding, Demonstrates understanding and Needs reinforcement    THERAPY DAILY BILLING   Insurance: [employer] EXCHANGE 2. N/A    Evaluation Procedures:  No evaluation codes were used on this date of service    Timed Procedures:  Gait Training, 15 minutes  Neuromuscular Re-Education, 20 minutes  Therapeutic Exercise, 10 minutes    Untimed Procedures:  No untimed codes were used on this date of service    Total Treatment Time: 45 minutes   patient

## 2021-01-27 NOTE — H&P ADULT - HISTORY OF PRESENT ILLNESS
89 y/o female with PMHx of HTN, Hypothyroidism, chronic B/L lower extremities weakness (R>>L), s/p right hip replacement in 2013, CKD stage 3, PVD s/p ? bypass/stent, CAD s/p 1 stent presents to the ED c/p worsening LE weakness (R>>L) for the past few weeks.        88 y/o female with PMHx of HTN, Hypothyroidism, chronic B/L lower extremities weakness (R>>L), s/p right hip replacement in 2013, CKD stage 3, PVD s/p ? bypass/stent, CAD s/p 1 stent presents to the ED c/p worsening LE weakness (R>>L) for the past few weeks. Pt reports a "blockage" was found in her LE B/L, however L>>R, and is s/p surgery however pain has persisted intermittently and has worsened in the last few weeks. Pt describes pain as crampy, noted more in am after waking up. Pt also c/o generalized weakness for the past few weeks, ongoing unintentional wt loss for the past few months, increasingly "puffy" abdomen for the past few 3 weeks as well as loss of appetite, and decreased PO intake. Pt reports she reported her wt loss to PCP and was PCP suspected malignancy however no work up was done per pt, her medications were adjusted. Pt reports she was taken off all her medications except for ASA and Amlodipine. Pt reported a bout of nonbloody diarrhea a few weeks which resolved on its own however denies, N/V, or abdominal pain. Pt also reports change in color of her urine; darker, sediments in the last 2 weeks. Pt denies fever, chills, night sweats, or vaginal bleeding or hx of malignancy (was up to date on cancer screening until she aged-out)    In ED initial vitals stable. Labs sig for H/H 10.4/33.5, .5, LA 2.4, TSH 8.6, BUN/Cr 45/1.46, for rest of labs see below. UA +ve bacteriuria, CT A/P showed Very large infiltrative cystic and solid mass in the pelvis, with extensive peritoneal carcinomatosis and large volume diffuse ascites. 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 2. Small bilateral pleural effusions. Pt given Rocephin and IVF.

## 2021-01-27 NOTE — CHART NOTE - NSCHARTNOTEFT_GEN_A_CORE
88 y/o female with PMHx of HTN, Hypothyroidism, chronic B/L lower extremities weakness (R>>L), s/p right hip replacement in 2013, CKD stage 3, PVD s/p ? bypass/stent, CAD s/p 1 stent presents to the ED c/p worsening LE weakness (R>>L) for the past few weeks. Pt reports a "blockage" was found in her LE B/L, however L>>R, and is s/p surgery however pain has persisted intermittently and has worsened in the last few weeks. Pt describes pain as crampy, noted more in am after waking up. Pt also c/o generalized weakness for the past few weeks, ongoing unintentional wt loss for the past few months, increasingly "puffy" abdomen for the past few 3 weeks as well as loss of appetite, and decreased PO intake. Pt reports she reported her wt loss to PCP and was PCP suspected malignancy however no work up was done per pt, her medications were adjusted. Pt reports she was taken off all her medications except for ASA and Amlodipine. Pt reported a bout of nonbloody diarrhea a few weeks which resolved on its own however denies, N/V, or abdominal pain. Pt also reports change in color of her urine; darker, sediments in the last 2 weeks. Pt denies fever, chills, night sweats, or vaginal bleeding or hx of malignancy (was up to date on cancer screening until she aged-out)  In ED initial vitals stable. Labs sig for H/H 10.4/33.5, .5, LA 2.4, TSH 8.6, BUN/Cr 45/1.46, for rest of labs see below. UA +ve bacteriuria, CT A/P showed Very large infiltrative cystic and solid mass in the pelvis, with extensive peritoneal carcinomatosis and large volume diffuse ascites. 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 2. Small bilateral pleural effusions.   Stop Abx, AU neg, follow up Clx,  Follow up cancer markers, hem following  Pt still undecided about biopsy  DVT ppx

## 2021-01-27 NOTE — H&P ADULT - NSHPREVIEWOFSYSTEMS_GEN_ALL_CORE
-    CONSTITUTIONAL: No fever or chills.  EYES: No eye pain, visual disturbances, or discharge.  ENMT:  No difficulty hearing, sinus or throat pain.  NECK: No pain or stiffness.	  RESPIRATORY: No cough, wheezing, or hemoptysis; No shortness of breath.  CARDIOVASCULAR: No chest pain, palpitations, dizziness, or leg swelling.  GASTROINTESTINAL: No abdominal pain, no nausea, vomiting, or hematemesis; No diarrhea or Change in bowel habits. No melena or hematochezia, last BM was yesterday.  GENITOURINARY: No dysuria, frequency, hematuria, or incontinence.  NEUROLOGICAL: No headaches, denies focal muscle weakness, numbness, or tremors.  SKIN: No itching, burning or rashes.  MUSCULOSKELETAL: No joint swelling or pain.  PSYCHIATRIC: No depression, anxiety, or agitation.  HEME/LYMPH: No easy bruising, bleeding gums, or nose bleed.  ALLERGY AND IMMUNOLOGIC: No hives or eczema.

## 2021-01-27 NOTE — H&P ADULT - ASSESSMENT
89 y/o female with PMHx of HTN, Hypothyroidism, chronic B/L lower extremities weakness (R>>L), s/p right hip replacement in 2013, CKD stage 3, PVD s/p ? bypass/stent, CAD s/p 1 stent presents to the ED c/p worsening LE weakness (R>>L) for the past few weeks, in addition to wt loss, worsening abdominal distension and change in color of urine. Pt being admitted for acute UTI, Pelvic mass w/ ascites, anemia.    IMPROVE VTE Individual Risk Assessment    RISK                                                                Points    [  ] Previous VTE                                                  3    [  ] Thrombophilia                                               2    [  ] Lower limb paralysis                                      2        (unable to hold up >15 seconds)      [  ] Current Cancer                                              2         (within 6 months)    [  ] Immobilization > 24 hrs                                1    [  ] ICU/CCU stay > 24 hours                              1    [  ] Age > 60                                                      1    IMPROVE VTE Score ____3_____    IMPROVE Score 0-1: Low Risk, No VTE prophylaxis required for most patients, encourage ambulation.   IMPROVE Score 2-3: At risk, pharmacologic VTE prophylaxis is indicated for most patients (in the absence of a contraindication)  IMPROVE Score > or = 4: High Risk, pharmacologic VTE prophylaxis is indicated for most patients (in the absence of a contraindication)

## 2021-01-27 NOTE — H&P ADULT - PROBLEM SELECTOR PLAN 2
stating that she had "a good meal"  today and feels fine, IVF hydration with LR at 100 ml/h, monitor I 7 O, registered dietitian consult.

## 2021-01-27 NOTE — H&P ADULT - NSHPLABSRESULTS_GEN_ALL_CORE
-        144  |  114<H>  |  42<H>  ----------------------------<  100<H>  4.4   |  21<L>  |  1.36<H>    Ca    9.0      2021 10:57  Phos  3.6       Mg     2.4         TPro  7.8  /  Alb  2.8<L>  /  TBili  0.5  /  DBili  x   /  AST  33  /  ALT  10<L>  /  AlkPhos  54                            10.8   5.78  )-----------( 273      ( 2021 10:57 )             33.7     CARDIAC MARKERS ( 2021 20:03 )  <.015 ng/mL / x     / 66 U/L / x     / 3.0 ng/mL      LIVER FUNCTIONS - ( 2021 10:57 )  Alb: 2.8 g/dL / Pro: 7.8 gm/dL / ALK PHOS: 54 U/L / ALT: 10 U/L / AST: 33 U/L / GGT: x           PT/INR - ( 2021 20:03 )   PT: 14.0 sec;   INR: 1.22 ratio    PTT - ( 2021 20:03 )  PTT:28.6 sec  Urinalysis Basic - ( 2021 22:35 )    Color: Yellow / Appearance: Clear / S.020 / pH: x  Gluc: x / Ketone: Small  / Bili: Negative / Urobili: Negative mg/dL   Blood: x / Protein: 30 mg/dL / Nitrite: Negative   Leuk Esterase: Trace / RBC: 0-2 /HPF / WBC 3-5   Sq Epi: x / Non Sq Epi: Few / Bacteria: TNTC      SARS-CoV-2: NotDetec (2021 21:04)       CT ABDOMEN AND PELVIS IC  &  CT CHEST                          PROCEDURE DATE:  2021    INTERPRETATION:  CLINICAL INFORMATION: Unintentional weight loss, weakness, cachexia, evaluate for mass  COMPARISON: None.  PROCEDURE:  CT of the Chest, Abdomen and Pelvis was performed with intravenous contrast.  Intravenous contrast: 90 ml Omnipaque 350. 10 ml discarded.  Oral contrast: None.  1. Very large infiltrative cystic and solid mass in the pelvis, with extensive peritoneal carcinomatosis and large volume diffuse ascites. 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  2. Small bilateral pleural effusions.        XR CHEST PORTABLE URGENT 1V                        PROCEDURE DATE:  2021    Heart magnified by technique.  Scattered mid lower lung field infiltrates are noted suggesting Covid pneumonia.  IMPRESSION: Bilateral infiltrates as above.  Personally reviewed by me, marked pulmonary trunk, right & left pulmonary artery dilation, no pulmonary infiltrates. -        144  |  114<H>  |  42<H>  ----------------------------<  100<H>  4.4   |  21<L>  |  1.36<H>    Ca    9.0      2021 10:57  Phos  3.6       Mg     2.4         TPro  7.8  /  Alb  2.8<L>  /  TBili  0.5  /  DBili  x   /  AST  33  /  ALT  10<L>  /  AlkPhos  54                            10.8   5.78  )-----------( 273      ( 2021 10:57 )             33.7     CARDIAC MARKERS ( 2021 20:03 )  <.015 ng/mL / x     / 66 U/L / x     / 3.0 ng/mL      LIVER FUNCTIONS - ( 2021 10:57 )  Alb: 2.8 g/dL / Pro: 7.8 gm/dL / ALK PHOS: 54 U/L / ALT: 10 U/L / AST: 33 U/L / GGT: x           PT/INR - ( 2021 20:03 )   PT: 14.0 sec;   INR: 1.22 ratio    PTT - ( 2021 20:03 )  PTT:28.6 sec  Urinalysis Basic - ( 2021 22:35 )    Color: Yellow / Appearance: Clear / S.020 / pH: x  Gluc: x / Ketone: Small  / Bili: Negative / Urobili: Negative mg/dL   Blood: x / Protein: 30 mg/dL / Nitrite: Negative   Leuk Esterase: Trace / RBC: 0-2 /HPF / WBC 3-5   Sq Epi: x / Non Sq Epi: Few / Bacteria: TNTC      SARS-CoV-2: NotDetec (2021 21:04)       CT ABDOMEN AND PELVIS IC  &  CT CHEST                          PROCEDURE DATE:  2021    INTERPRETATION:  CLINICAL INFORMATION: Unintentional weight loss, weakness, cachexia, evaluate for mass  COMPARISON: None.  PROCEDURE:  CT of the Chest, Abdomen and Pelvis was performed with intravenous contrast.  Intravenous contrast: 90 ml Omnipaque 350. 10 ml discarded.  Oral contrast: None.  1. Very large infiltrative cystic and solid mass in the pelvis, with extensive peritoneal carcinomatosis and large volume diffuse ascites. 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  2. Small bilateral pleural effusions.        XR CHEST PORTABLE URGENT 1V                        PROCEDURE DATE:  2021    Heart magnified by technique.  Scattered mid lower lung field infiltrates are noted suggesting Covid pneumonia.  IMPRESSION: Bilateral infiltrates as above.  Personally reviewed by me, marked pulmonary trunk, right & left pulmonary artery dilation, no pulmonary infiltrates.        EKG:    As per my review shows ST at 105/min, with frequent PACs, normal MO & prolonged QTc intervals, LAD (- 60), low QRS voltage, normal duration, with late transition, nonspecific ST-T abnormality      - -        144  |  114<H>  |  42<H>  ----------------------------<  100<H>  4.4   |  21<L>  |  1.36<H>    Ca    9.0      2021 10:57  Phos  3.6       Mg     2.4         TPro  7.8  /  Alb  2.8<L>  /  TBili  0.5  /  DBili  x   /  AST  33  /  ALT  10<L>  /  AlkPhos  54                            10.8   5.78  )-----------( 273      ( 2021 10:57 )             33.7     CARDIAC MARKERS ( 2021 20:03 )  <.015 ng/mL / x     / 66 U/L / x     / 3.0 ng/mL      LIVER FUNCTIONS - ( 2021 10:57 )  Alb: 2.8 g/dL / Pro: 7.8 gm/dL / ALK PHOS: 54 U/L / ALT: 10 U/L / AST: 33 U/L / GGT: x           PT/INR - ( 2021 20:03 )   PT: 14.0 sec;   INR: 1.22 ratio    PTT - ( 2021 20:03 )  PTT:28.6 sec  Urinalysis Basic - ( 2021 22:35 )    Color: Yellow / Appearance: Clear / S.020 / pH: x  Gluc: x / Ketone: Small  / Bili: Negative / Urobili: Negative mg/dL   Blood: x / Protein: 30 mg/dL / Nitrite: Negative   Leuk Esterase: Trace / RBC: 0-2 /HPF / WBC 3-5   Sq Epi: x / Non Sq Epi: Few / Bacteria: TNTC      SARS-CoV-2: NotDetec (2021 21:04)       CT ABDOMEN AND PELVIS IC  &  CT CHEST                          PROCEDURE DATE:  2021    INTERPRETATION:  CLINICAL INFORMATION: Unintentional weight loss, weakness, cachexia, evaluate for mass  COMPARISON: None.  PROCEDURE:  CT of the Chest, Abdomen and Pelvis was performed with intravenous contrast.  Intravenous contrast: 90 ml Omnipaque 350. 10 ml discarded.  Oral contrast: None.  1. Very large infiltrative cystic and solid mass in the pelvis, with extensive peritoneal carcinomatosis and large volume diffuse ascites. 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  2. Small bilateral pleural effusions.        XR CHEST PORTABLE URGENT 1V                        PROCEDURE DATE:  2021    Heart magnified by technique.  Scattered mid lower lung field infiltrates are noted suggesting Covid pneumonia.  IMPRESSION: Bilateral infiltrates as above.    Personally reviewed by me, marked pulmonary trunk, right & left pulmonary artery dilation, no pulmonary infiltrates.        EKG:    As per my review shows ST at 105/min, with frequent PACs, normal KS & prolonged QTc intervals, LAD (- 60), low QRS voltage, normal duration, with late transition, nonspecific ST-T abnormality      -

## 2021-01-27 NOTE — CONSULT NOTE ADULT - PROBLEM SELECTOR RECOMMENDATION 9
- d/w patient about CT findings and possible differential including maligancy, patient lives by her self in [place] and has family in [place]  - She wants to think about it before agreeing for biopsy  - suggest palliative care evaluation for goals of care  - ca 125, CEA

## 2021-01-27 NOTE — H&P ADULT - NSCORESITESY/N_GEN_A_CORE_RD
[address]  [address]                    CONSULTATION                  
_______________________________________________________________________________
 
Name:       MOON CLARK                  Room:           [address] [place]  
.R.#:  [id number]      Account #:      [id number]  
Admission:  02/18/18     Attend Phys:    Jerson Gallagher MD 
Discharge:  02/24/18     Date of Birth:  [birth year]  
         Report #: [id number]
                                                                     [id number]  
_______________________________________________________________________________
THIS REPORT FOR:  //name//                      
 
CC: Jerson Terry MD
 
DICTATED BY: Zofia Carter [facility]
 
DATE OF SERVICE:  02/19/2018
 
 
PRIMARY CARE PHYSICIAN:  Jermaine Terry MD
 
Please note at the time of this dictation, the patient was seen and physically
examined by myself.
 
REASON FOR CONSULTATION:  Elevated LFTs, possible choledocholithiasis.
 
HISTORY OF PRESENT ILLNESS:  This is a 58-year-old -American female who
presented to the Emergency Room with increasing epigastric to right upper
quadrant pain associated with some nausea, which had been intermittent over the
last few months, but had at times was very radiating to the upper right back and
shoulder.  She does have some history of difficulty with bowel and bladder
incontinence, but that has been present for greater than 1 year.  She does
report getting full very quickly over the last couple of months, but thought it
was more related to her congestive heart failure.  The patient had been taken
naproxen regularly up until about 2 weeks ago and she quit taking that.  The
patient underwent a colonoscopy back in 11/2016 that showed a colon polyp,
diverticulosis and internal hemorrhoids.  EGD showed mild gastritis.  Biopsies
showed tubular adenoma of the polyp with a repeat in 5 years.
 
ALLERGIES:  CONTRAST IV AND ORAL.
 
MEDICATIONS:  From home hydrocodone, Zofran, milk of mag, Cozaar, Coreg,
clonidine, Cymbalta, allopurinol, glimepiride, hydralazine, Lasix, amiodarone,
Ventolin, Neurontin.
 
PAST MEDICAL HISTORY:  Includes diabetes, heart disease, hypertension, lung
disease, asthma, renal disease, and GERD.
 
PAST SURGICAL HISTORY:  Laminectomy, ectopic pregnancy with tube removal,
appendectomy and a pacer and defibrillator placed.
 
FAMILY HISTORY:  Significant for breast cancer in her mother and sister as well
as colon cancer in her maternal grandmother.
 
 
 
 
[place] [address]                    CONSULTATION                  
_______________________________________________________________________________
 
Name:       MOON CLARK                  Room:           [address]#:  [id number]      Account #:      [id number]  
Admission:  02/18/18     Attend Phys:    Jerson Gallagher MD 
Discharge:  02/24/18     Date of Birth:  [birth year]  
         Report #: [id number]
                                                                     [id number]  
_______________________________________________________________________________
SOCIAL HISTORY:  Denies any alcohol, tobacco or illegal drug use.
 
REVIEW OF SYSTEMS:  Twelve-point review of systems is essentially negative
except what is mentioned in the HPI.
 
PHYSICAL EXAMINATION:
VITAL SIGNS:  Temperature 36.9, pulse 88, respirations 20, blood pressure
162/78.
HEART:  Regular rate and rhythm.
LUNGS:  Clear.
ABDOMEN:  Soft, positive bowel sounds in all 4 quadrants with some very minimal
tenderness noted in the mid epigastric to right upper quadrant area.
 
LABORATORY DATA:  Hemoglobin 12.1, hematocrit 36.5, white count is 8.2,
platelets 324.  Sodium 144, potassium 4.1, chloride 106, CO2 of 28, BUN is 18,
creatinine is 1.3, and GFR is 51.  Total bilirubin 0.8, alkaline phosphatase is
96, ALT is 123 and AST is 85, all which are trending down except for ALT bumped
from 107-123, and lipase is normal.
 
IMPRESSION:
1.  Acute cholelithiasis.
2.  Elevated LFTs.
3.  Gastroesophageal reflux disease with history of NSAID use.
 
PLAN:  It appears that plans for surgical intervention either tomorrow or the
next day and would recommend a laparoscopic cholecystectomy with IOC at that
time.  We will wait and see if there are any further instructions at this time.
 
Thank you for allowing us to participate in this patient's care.  Please do not
hesitate to call with any questions in regard with this consult.
 
 
 
 
 
 
 
 
 
 
 
 
 
 
<ELECTRONICALLY SIGNED>
                                        By:  Heather Mendes MD            
03/08/18     1450
D: 02/19/18 1245_______________________________________
T: 02/19/18 [id number] Mendes MD               /nt Yes

## 2021-01-27 NOTE — H&P ADULT - PROBLEM SELECTOR PLAN 3
ML related to her malignancy & anorexia, was ambulating with cane because of her chronic B/L lower extremity weakness, now with sacral decubitus, PT eval in am. ML due to the general catabolic state related to her malignancy & anorexia, was previously ambulating with cane because of her chronic B/L lower extremity weakness, now with sacral decubitus, PT eval in am.

## 2021-01-27 NOTE — H&P ADULT - NSHPLABSRESULTS_GEN_ALL_CORE
T(C): 36.7 (21 @ 02:11), Max: 36.8 (21 @ 22:36)  HR: 98 (21 @ 02:11) (76 - 98)  BP: 123/63 (21 @ 02:11) (123/63 - 141/100)  RR: 16 (21 @ 02:11) (16 - 18)  SpO2: 99% (21 @ 02:11) (95% - 99%)                        10.4   6.26  )-----------( 275      ( 2021 20:03 )             33.3         143  |  109<H>  |  45<H>  ----------------------------<  116<H>  4.6   |  19<L>  |  1.46<H>    Ca    9.4      2021 20:03    TPro  7.9  /  Alb  2.9<L>  /  TBili  0.7  /  DBili  x   /  AST  30  /  ALT  12  /  AlkPhos  58      LIVER FUNCTIONS - ( 2021 20:03 )  Alb: 2.9 g/dL / Pro: 7.9 gm/dL / ALK PHOS: 58 U/L / ALT: 12 U/L / AST: 30 U/L / GGT: x           PT/INR - ( 2021 20:03 )   PT: 14.0 sec;   INR: 1.22 ratio         PTT - ( 2021 20:03 )  PTT:28.6 sec  Urinalysis Basic - ( 2021 22:35 )    Color: Yellow / Appearance: Clear / S.020 / pH: x  Gluc: x / Ketone: Small  / Bili: Negative / Urobili: Negative mg/dL   Blood: x / Protein: 30 mg/dL / Nitrite: Negative   Leuk Esterase: Trace / RBC: 0-2 /HPF / WBC 3-5   Sq Epi: x / Non Sq Epi: Few / Bacteria: TNTC      < from: CT Abdomen and Pelvis w/ IV Cont (21 @ 21:55) >      IMPRESSION:    1. Very large infiltrative cystic and solid mass in the pelvis, with extensive peritoneal carcinomatosis and large volume diffuse ascites. 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  2. Small bilateral pleural effusions.      < end of copied text >    < from: CT Head No Cont (21 @ 21:56) >    IMPRESSION:  No CT evidence of acute intracranial hemorrhage, mass effect or acute territorial infarct.    Moderate generalized cerebral volume loss and moderate chronic microvascular ischemic disease.    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    < end of copied text >        acetaminophen   Tablet .. 650 milliGRAM(s) Oral every 6 hours PRN  ondansetron Injectable 4 milliGRAM(s) IV Push every 6 hours PRN

## 2021-01-27 NOTE — H&P ADULT - NSHPPHYSICALEXAM_GEN_ALL_CORE
-    Vital Signs Last 24 Hrs  T(C): 37.2 (27 Jan 2021 21:19), Max: 37.2 (27 Jan 2021 21:19)  T(F): 99 (27 Jan 2021 21:19), Max: 99 (27 Jan 2021 21:19)  HR: 66 (27 Jan 2021 21:19) (66 - 106)  BP: 131/79 (27 Jan 2021 21:19) (118/72 - 151/74)  BP(mean): 99 (27 Jan 2021 15:53) (99 - 99)  RR: 14 (27 Jan 2021 21:19) (14 - 17)  SpO2: 99% (27 Jan 2021 21:19) (98% - 100%)        PHYSICAL EXAM:  		  GENERAL: NAD, well-developed, cachectic.  HEAD:  Atraumatic, Norm cephalic.  EYES: PERRLA, conjunctiva clear.  ENMT: no nasal discharge, MMM.   NECK: Supple, No JVD.  NERVOUS SYSTEM:  Alert & oriented X3, moves all extremities.  CHEST/LUNG: Fair air entry B/L, no rales, rhonchi, or wheezing.  HEART: Normal S1 & acc P2, dropped beats, grade II/VI HSM over left lower sternal border increases with inspiration, no extra sounds.  ABDOMEN: Soft, non-tender, markedly distended; bowel sounds present, can't palpate deeply for masses or organomegaly.  EXTREMITIES:  No clubbing, cyanosis, or edema.  VASCULAR: 2+ radial, DPA / PTA pulses B/L.  SKIN: No rashes, (+) stage 2 sacral decubitus.  PSYCH: normal affect & behavior.

## 2021-01-27 NOTE — H&P ADULT - NSHPSOCIALHISTORY_GEN_ALL_CORE
-    ,   in December, lives alone, family in [place] (daughter), non smoker, no ETOH or drug abuse.

## 2021-01-27 NOTE — H&P ADULT - NSICDXPASTMEDICALHX_GEN_ALL_CORE_FT
PAST MEDICAL HISTORY:  CKD (chronic kidney disease) stage 3, GFR 30-59 ml/min     HTN (hypertension), benign     PVD (peripheral vascular disease)

## 2021-01-27 NOTE — H&P ADULT - PROBLEM SELECTOR PLAN 8
ML 2ry to hypothyroidism, had already anemia w/u, no need for any supplementation, monitor as hypothyroidism is being corrected.

## 2021-01-28 DIAGNOSIS — D53.9 NUTRITIONAL ANEMIA, UNSPECIFIED: ICD-10-CM

## 2021-01-28 LAB
A1C WITH ESTIMATED AVERAGE GLUCOSE RESULT: 5.6 % — SIGNIFICANT CHANGE UP (ref 4–5.6)
ANION GAP SERPL CALC-SCNC: 11 MMOL/L — SIGNIFICANT CHANGE UP (ref 5–17)
BUN SERPL-MCNC: 36 MG/DL — HIGH (ref 7–23)
CALCIUM SERPL-MCNC: 9.2 MG/DL — SIGNIFICANT CHANGE UP (ref 8.4–10.5)
CANCER AG125 SERPL-ACNC: 6151 U/ML — HIGH
CEA SERPL-MCNC: 6.6 NG/ML — HIGH (ref 0–3.8)
CHLORIDE SERPL-SCNC: 111 MMOL/L — HIGH (ref 96–108)
CO2 SERPL-SCNC: 23 MMOL/L — SIGNIFICANT CHANGE UP (ref 22–31)
CREAT SERPL-MCNC: 1.3 MG/DL — SIGNIFICANT CHANGE UP (ref 0.5–1.3)
ESTIMATED AVERAGE GLUCOSE: 114 MG/DL — SIGNIFICANT CHANGE UP (ref 68–114)
GLUCOSE SERPL-MCNC: 97 MG/DL — SIGNIFICANT CHANGE UP (ref 70–99)
HCT VFR BLD CALC: 33.9 % — LOW (ref 34.5–45)
HGB BLD-MCNC: 11.2 G/DL — LOW (ref 11.5–15.5)
MAGNESIUM SERPL-MCNC: 2.3 MG/DL — SIGNIFICANT CHANGE UP (ref 1.6–2.6)
MCHC RBC-ENTMCNC: 32.7 PG — SIGNIFICANT CHANGE UP (ref 27–34)
MCHC RBC-ENTMCNC: 33 GM/DL — SIGNIFICANT CHANGE UP (ref 32–36)
MCV RBC AUTO: 99.1 FL — SIGNIFICANT CHANGE UP (ref 80–100)
NRBC # BLD: 0 /100 WBCS — SIGNIFICANT CHANGE UP (ref 0–0)
PLATELET # BLD AUTO: 269 K/UL — SIGNIFICANT CHANGE UP (ref 150–400)
POTASSIUM SERPL-MCNC: 4.5 MMOL/L — SIGNIFICANT CHANGE UP (ref 3.5–5.3)
POTASSIUM SERPL-SCNC: 4.5 MMOL/L — SIGNIFICANT CHANGE UP (ref 3.5–5.3)
RBC # BLD: 3.42 M/UL — LOW (ref 3.8–5.2)
RBC # FLD: 14.6 % — HIGH (ref 10.3–14.5)
SARS-COV-2 IGG SERPL QL IA: NEGATIVE — SIGNIFICANT CHANGE UP
SARS-COV-2 IGM SERPL IA-ACNC: 0.07 INDEX — SIGNIFICANT CHANGE UP
SODIUM SERPL-SCNC: 145 MMOL/L — SIGNIFICANT CHANGE UP (ref 135–145)
WBC # BLD: 4.86 K/UL — SIGNIFICANT CHANGE UP (ref 3.8–10.5)
WBC # FLD AUTO: 4.86 K/UL — SIGNIFICANT CHANGE UP (ref 3.8–10.5)

## 2021-01-28 PROCEDURE — 93010 ELECTROCARDIOGRAM REPORT: CPT

## 2021-01-28 PROCEDURE — 99233 SBSQ HOSP IP/OBS HIGH 50: CPT

## 2021-01-28 PROCEDURE — 99497 ADVNCD CARE PLAN 30 MIN: CPT

## 2021-01-28 RX ADMIN — HEPARIN SODIUM 5000 UNIT(S): 5000 INJECTION INTRAVENOUS; SUBCUTANEOUS at 17:49

## 2021-01-28 RX ADMIN — HEPARIN SODIUM 5000 UNIT(S): 5000 INJECTION INTRAVENOUS; SUBCUTANEOUS at 06:41

## 2021-01-28 RX ADMIN — Medication 81 MILLIGRAM(S): at 12:49

## 2021-01-28 RX ADMIN — AMLODIPINE BESYLATE 5 MILLIGRAM(S): 2.5 TABLET ORAL at 06:41

## 2021-01-28 RX ADMIN — SODIUM CHLORIDE 100 MILLILITER(S): 9 INJECTION, SOLUTION INTRAVENOUS at 20:30

## 2021-01-28 RX ADMIN — SODIUM CHLORIDE 100 MILLILITER(S): 9 INJECTION, SOLUTION INTRAVENOUS at 00:23

## 2021-01-28 RX ADMIN — SODIUM CHLORIDE 100 MILLILITER(S): 9 INJECTION, SOLUTION INTRAVENOUS at 10:50

## 2021-01-28 RX ADMIN — Medication 25 MICROGRAM(S): at 06:41

## 2021-01-28 NOTE — GOALS OF CARE CONVERSATION - ADVANCED CARE PLANNING - CONVERSATION DETAILS
Writer met  with  pt at bedside. Reviewed patient's medical and social history as well as events leading to patient's hospitalization. Writer discussed patient's current diagnosis (pelvic mass,malignant neoplasm,PVD,CKD,HTN,Anemia,),  medical condition and management,  prognosis, and life expectancy. Inquired about patient's wishes regarding extent of medical care to be provided including escalation of medical care into the ICU and use of vasopressor support. In addition, the writer inquired about thoughts regarding cardiopulmnary resuscitation, artificial nutrition and hydration including use of feeding tubes and IVF, antibiotics, and further investigative studies such as blood draws and radiology.Pt. showed good insight into medical condition. All questions answered. Writer recommended complete HCP and discuss her wishes with her family.Pt wants resuscitation she doesn't want to live on machines. Psychosocial support provided.Spoke with pt about hospice care at home she will consider.

## 2021-01-28 NOTE — PHYSICAL THERAPY INITIAL EVALUATION ADULT - PERTINENT HX OF CURRENT PROBLEM, REHAB EVAL
Admitted with generalized weakness,transfered from [facility], newly diagnosed pelvic mass, CKD,anorexia. To have a paracentesis?

## 2021-01-28 NOTE — PROGRESS NOTE ADULT - PROBLEM SELECTOR PLAN 3
Likely due to the general catabolic state related to her malignancy & anorexia, was previously ambulating with cane because of her chronic B/L lower extremity weakness, now with sacral decubitus, PT eval

## 2021-01-28 NOTE — PROGRESS NOTE ADULT - PROBLEM SELECTOR PLAN 2
Severe protein calorie malnutrition, cachexia   remove diet restrictions, patient states she drinks ensure clear at home as she does not tolerate regular ensure, trial ensure pudding.

## 2021-01-28 NOTE — PROGRESS NOTE ADULT - SUBJECTIVE AND OBJECTIVE BOX
Patient is a 89y old  Female who presents with a chief complaint of Generalized weakness. (27 Jan 2021 22:37)    INTERVAL HPI/OVERNIGHT EVENTS:  feels ok, didnt like breakfast much.  no new complaints.     MEDICATIONS  (STANDING):  amLODIPine   Tablet 5 milliGRAM(s) Oral daily  aspirin enteric coated 81 milliGRAM(s) Oral daily  heparin   Injectable 5000 Unit(s) SubCutaneous every 12 hours  lactated ringers. 1000 milliLiter(s) (100 mL/Hr) IV Continuous <Continuous>  levothyroxine 25 MICROGram(s) Oral daily    MEDICATIONS  (PRN):      Allergies  No Known Allergies    REVIEW OF SYSTEMS:  reviewed negative.     Vital Signs Last 24 Hrs  T(C): 36.6 (28 Jan 2021 06:11), Max: 37.2 (27 Jan 2021 21:19)  T(F): 97.8 (28 Jan 2021 06:11), Max: 99 (27 Jan 2021 21:19)  HR: 90 (28 Jan 2021 06:11) (66 - 106)  BP: 129/73 (28 Jan 2021 06:11) (118/59 - 151/74)  BP(mean): 99 (27 Jan 2021 15:53) (99 - 99)  RR: 18 (28 Jan 2021 06:11) (14 - 18)  SpO2: 94% (28 Jan 2021 06:11) (94% - 100%)    PHYSICAL EXAM:  GENERAL: cachectic  HEAD:  Atraumatic, Normocephalic  EYES: conjunctiva and sclera clear  ENMT: Moist mucous membranes  NECK: Supple, No JVD  NERVOUS SYSTEM:  Alert & Oriented X3, Good concentration; All 4 extremities mobile, no gross sensory deficits.   CHEST/LUNG: Clear to auscultation bilaterally;   HEART: Regular rate and rhythm;   ABDOMEN: somewhat tense, distended, +BS  EXTREMITIES:  2+ Peripheral Pulses    LABS:                        11.2   4.86  )-----------( 269      ( 28 Jan 2021 08:14 )             33.9     28 Jan 2021 08:14    145    |  111    |  36     ----------------------------<  97     4.5     |  23     |  1.30     Ca    9.2        28 Jan 2021 08:14  Phos  3.6       27 Jan 2021 10:57  Mg     2.3       28 Jan 2021 08:14    TPro  7.8    /  Alb  2.8    /  TBili  0.5    /  DBili  x      /  AST  33     /  ALT  10     /  AlkPhos  54     27 Jan 2021 10:57    PT/INR - ( 26 Jan 2021 20:03 )   PT: 14.0 sec;   INR: 1.22 ratio     PTT - ( 26 Jan 2021 20:03 )  PTT:28.6 sec      CAPILLARY BLOOD GLUCOSE          RADIOLOGY & ADDITIONAL TESTS:    Imaging Personally Reviewed:  [ ] YES     Consultant(s) Notes Reviewed:      Care Discussed with Consultants/Other Providers:    Advanced Directives: [ ] DNR  [ ] No feeding tube  [ ] MOLST in chart  [ ] MOLST completed today  [ ] Unknown

## 2021-01-28 NOTE — PROGRESS NOTE ADULT - PROBLEM SELECTOR PLAN 1
patient is aware of the mass,  & CEA pending  Paracentesis -Diagnostic and Theraputic d/w IR, possibly today vs tomorrow  Palliative care consult.

## 2021-01-28 NOTE — DIETITIAN INITIAL EVALUATION ADULT. - OTHER INFO
Pt is an 89 y/o F with PMH of HTN, Dyslipidemia, Pre DM, CAD s/p PCI, PVD, DVT in 2019, CKD stage 3, and Hypothyroidism who was transferred from [facility] after she was admitted there for a newly diagnosed pelvic mass.  CT abdomen & pelvis with contrast showed a large pelvic mass with extensive peritoneal carcinomatosis and large amount of ascites. IR paracentesis pending. Pt reported usual wt about 117# about 6 months ago. Currently she weighs 94# Pt had weight loss of 23.0# (19%) which is significant. She reports decreased appetite which started about a month ago: Pt reports she was able to consume toast, jam and ensure clear. As per Nutrition Focused Physical Exam, pt found to have severe temporal, orbital and buccal wasting along with severe shoulder, chest and triceps wasting. Pt meets criteria for severe malnutrition in the context of chronic illness. Pt denies hx DM (noted pre-diabetes in medical record) Pt prefers softer foods and avoids orange juice and acidic foods. Recommend magic cup and ensure clear which pt requests. MD already aware and supplement is in diet rx. Diet liberalized as well from consistent carbohydrate/DASH/TLC to regular which is appropriate.

## 2021-01-28 NOTE — DIETITIAN INITIAL EVALUATION ADULT. - PROBLEM SELECTOR PLAN 3
ML due to the general catabolic state related to her malignancy & anorexia, was previously ambulating with cane because of her chronic B/L lower extremity weakness, now with sacral decubitus, PT eval in am.

## 2021-01-28 NOTE — DIETITIAN INITIAL EVALUATION ADULT. - PROBLEM SELECTOR PLAN 1
patient is aware of the mass, agreed to have paracentesis, scheduled for am, ordered  & CEA, please discuss with her daughter in [place] in am, may consider palliative care consult.

## 2021-01-28 NOTE — DIETITIAN NUTRITION RISK NOTIFICATION - TREATMENT: THE FOLLOWING DIET HAS BEEN RECOMMENDED
Diet, Regular:   Supplement Feeding Modality:  Oral  Ensure Clear Cans or Servings Per Day:  1       Frequency:  Three Times a day  Ensure Pudding Cans or Servings Per Day:  1       Frequency:  Daily (01-28-21 @ 10:14) [Active]      Pt is an 88 y/o F with PMH of HTN, Dyslipidemia, Pre DM, CAD s/p PCI, PVD, DVT in 2019, CKD stage 3, and Hypothyroidism who was transferred from [facility] after she was admitted there for a newly diagnosed pelvic mass.  CT abdomen & pelvis with contrast showed a large pelvic mass with extensive peritoneal carcinomatosis and large amount of ascites. IR paracentesis pending. Pt reported usual wt about 117# about 6 months ago. Currently she weighs 94# Pt had weight loss of 23.0# (19%) which is significant. She reports decreased appetite which started about a month ago: Pt reports she was able to consume toast, jam and ensure clear. As per Nutrition Focused Physical Exam, pt found to have severe temporal, orbital and buccal wasting along with severe shoulder, chest and triceps wasting. Pt meets criteria for severe malnutrition in the context of chronic illness. Pt denies hx DM (noted pre-diabetes in medical record) Pt prefers softer foods and avoids orange juice and acidic foods. Recommend magic cup and ensure clear which pt requests. MD already aware and supplement is in diet rx. Diet liberalized as well from consistent carbohydrate/DASH/TLC to regular which is appropriate

## 2021-01-28 NOTE — PROGRESS NOTE ADULT - PROBLEM SELECTOR PLAN 8
likely due to to hypothyroidism vs chronic disease with extensive malignancy, had already anemia w/u, no need for any supplementation, monitor as outpatient

## 2021-01-29 ENCOUNTER — TRANSCRIPTION ENCOUNTER (OUTPATIENT)
Age: 86
End: 2021-01-29

## 2021-01-29 ENCOUNTER — RESULT REVIEW (OUTPATIENT)
Age: 86
End: 2021-01-29

## 2021-01-29 DIAGNOSIS — R62.7 ADULT FAILURE TO THRIVE: ICD-10-CM

## 2021-01-29 PROCEDURE — 88305 TISSUE EXAM BY PATHOLOGIST: CPT | Mod: 26

## 2021-01-29 PROCEDURE — 49083 ABD PARACENTESIS W/IMAGING: CPT

## 2021-01-29 PROCEDURE — 88108 CYTOPATH CONCENTRATE TECH: CPT | Mod: 26

## 2021-01-29 PROCEDURE — 99232 SBSQ HOSP IP/OBS MODERATE 35: CPT

## 2021-01-29 RX ORDER — LEVOTHYROXINE SODIUM 125 MCG
1 TABLET ORAL
Qty: 0 | Refills: 0 | DISCHARGE
Start: 2021-01-29

## 2021-01-29 RX ADMIN — Medication 25 MICROGRAM(S): at 06:42

## 2021-01-29 RX ADMIN — Medication 81 MILLIGRAM(S): at 11:56

## 2021-01-29 RX ADMIN — AMLODIPINE BESYLATE 5 MILLIGRAM(S): 2.5 TABLET ORAL at 06:42

## 2021-01-29 RX ADMIN — HEPARIN SODIUM 5000 UNIT(S): 5000 INJECTION INTRAVENOUS; SUBCUTANEOUS at 17:48

## 2021-01-29 NOTE — PROGRESS NOTE ADULT - PROBLEM SELECTOR PLAN 1
patient is aware of the mass,  & CEA pending both elevated  s/p paracentesis with 3785ml removed.   Palliative care consult.  outpatient consult.

## 2021-01-29 NOTE — DISCHARGE NOTE NURSING/CASE MANAGEMENT/SOCIAL WORK - NSSCNAMETXT_GEN_ALL_CORE
[facility] [phone] or  [phone]  Nurse to visit the day after hospital discharge; physical therapist to follow. Please contact the home care agency at the above phone number if you have not heard from them by 12 noon on the day after your hospital discharge.

## 2021-01-29 NOTE — PROGRESS NOTE ADULT - SUBJECTIVE AND OBJECTIVE BOX
Patient is a 89y old  Female who presents with a chief complaint of Generalized weakness. (28 Jan 2021 11:02)    INTERVAL HPI/OVERNIGHT EVENTS: feeling ok, less abd discomfort/pressure.     MEDICATIONS  (STANDING):  amLODIPine   Tablet 5 milliGRAM(s) Oral daily  aspirin enteric coated 81 milliGRAM(s) Oral daily  heparin   Injectable 5000 Unit(s) SubCutaneous every 12 hours  lactated ringers. 1000 milliLiter(s) (100 mL/Hr) IV Continuous <Continuous>  levothyroxine 25 MICROGram(s) Oral daily    MEDICATIONS  (PRN):      Allergies  No Known Allergies    REVIEW OF SYSTEMS:  CONSTITUTIONAL: No fever, +++weight loss, + fatigue  EYES: No eye pain, visual disturbances, or discharge  ENMT:  No difficulty hearing, tinnitus, vertigo; No sinus or throat pain  NECK: No pain or stiffness  BREASTS: No pain, masses, or nipple discharge  RESPIRATORY: No cough, wheezing, chills or hemoptysis; No shortness of breath  CARDIOVASCULAR: No chest pain, palpitations, lightheadedness, or leg swelling  GASTROINTESTINAL: see hpi  GENITOURINARY: No dysuria, frequency, hematuria, or incontinence  NEUROLOGICAL: No headaches, memory loss, vertigo, loss of strength, numbness, or tremors  SKIN: No itching, burning, rashes, or lesions   LYMPH NODES: No enlarged glands  ENDOCRINE: No heat or cold intolerance; No hair loss; No polydipsia or polyuria  MUSCULOSKELETAL: No joint pain or swelling; No muscle, back, or extremity pain  PSYCHIATRIC: No depression, anxiety, or mood swings  HEME/LYMPH: No easy bruising, or bleeding gums  ALLERGY AND IMMUNOLOGIC: No hives or eczema    Vital Signs Last 24 Hrs  T(C): 36.8 (29 Jan 2021 07:25), Max: 37.1 (28 Jan 2021 21:49)  T(F): 98.3 (29 Jan 2021 07:25), Max: 98.7 (28 Jan 2021 21:49)  HR: 102 (29 Jan 2021 05:45) (94 - 112)  BP: 116/71 (29 Jan 2021 07:25) (112/58 - 135/72)  BP(mean): --  RR: 16 (29 Jan 2021 07:25) (16 - 18)  SpO2: 94% (29 Jan 2021 05:45) (94% - 100%)    PHYSICAL EXAM:  GENERAL: NAD, well-groomed, well-developed  HEAD:  Atraumatic, Normocephalic  EYES:  conjunctiva and sclera clear  ENMT: Moist mucous membranes  NECK: Supple, No JVD  NERVOUS SYSTEM:  Alert & Oriented X3, Good concentration; All 4 extremities mobile, no gross sensory deficits.   CHEST/LUNG: Clear to auscultation bilaterally;  HEART: Regular rate and rhythm;   ABDOMEN: Soft, Nontender, Nondistended; Bowel sounds present  EXTREMITIES:  2+ Peripheral Pulses, No clubbing, cyanosis, or edema    LABS:      Ca    9.2        28 Jan 2021 08:14          CAPILLARY BLOOD GLUCOSE          RADIOLOGY & ADDITIONAL TESTS:    Imaging Personally Reviewed:  [ ] YES     Consultant(s) Notes Reviewed:      Care Discussed with Consultants/Other Providers:    Advanced Directives: [ ] DNR  [ ] No feeding tube  [ ] MOLST in chart  [ ] MOLST completed today  [ ] Unknown

## 2021-01-29 NOTE — PROGRESS NOTE ADULT - PROBLEM SELECTOR PLAN 2
Severe protein calorie malnutrition, cachexia   remove diet restrictions, continue ensure clear and ensure pudding.

## 2021-01-29 NOTE — DISCHARGE NOTE PROVIDER - HOSPITAL COURSE
88 y/o F with PMH of HTN, Dyslipidemia, Pre DM, CAD s/p PCI, PVD, DVT in 2019, CKD stage 3, and Hypothyroidism who presented to the ED at [facility] with weaknes.  She as admitted there for a newly diagnosed pelvic mass. However due to bed availability patient was transferred to [facility].  Patient was experiencing anorexia & ongoing weight loss & generalized weakness over the past 2 months, generalized weakness. CT abdomen & pelvis with contrast showed a large pelvic mass with extensive peritoneal carcinomatosis and large amount of ascites, seen by HEM/ONC there.    Patient agreed to paracentesis.  She had 3785 ml of ascitic fluid drained with improvement in symptoms.   Ascitic fluid sent for cytopathology and will need to be followed up as an outpatient.     Patient diagnoses with severe protein calorie malnutrition & failure to thrive likely due to malignancy.  She was encouraged to eat and given Ensure clear.  after paracentesis her appetite improved.     Patient seen by PT, given a new walker and will have HCPT on discharge.       89 y/o F with PMH of HTN, Dyslipidemia, Pre DM, CAD s/p PCI, PVD, DVT in 2019, CKD stage 3, and Hypothyroidism who presented to the ED at [facility] with weaknes.  She as admitted there for a newly diagnosed pelvic mass. However due to bed availability patient was transferred to [facility].  Patient was experiencing anorexia & ongoing weight loss & generalized weakness over the past 2 months, generalized weakness. CT abdomen & pelvis with contrast showed a large pelvic mass with extensive peritoneal carcinomatosis and large amount of ascites, seen by HEM/ONC there.    Patient agreed to paracentesis.  She had 3785 ml of ascitic fluid drained with improvement in symptoms.   Ascitic fluid sent for cytopathology and will need to be followed up as an outpatient.     Patient diagnoses with severe protein calorie malnutrition & failure to thrive likely due to malignancy.  She was encouraged to eat and given Ensure clear.  after paracentesis her appetite improved.     Patient seen by PT, given a new walker and will have HCPT on discharge.    Spoke to daughter, she is aware of mother's discharge today.  She will be discharged with home care.  Rolling walker at bedside.      Vital Signs Last 24 Hrs  T(C): 36.8 (30 Jan 2021 10:25), Max: 36.9 (29 Jan 2021 21:22)  T(F): 98.2 (30 Jan 2021 10:25), Max: 98.5 (29 Jan 2021 21:22)  HR: 99 (30 Jan 2021 10:25) (89 - 99)  BP: 108/63 (30 Jan 2021 10:25) (106/61 - 118/61)  BP(mean): --  RR: 17 (30 Jan 2021 10:25) (17 - 18)  SpO2: 99% (30 Jan 2021 10:25) (96% - 100%)    GENERAL: NAD, cachetic   HEAD:  Atraumatic, Normocephalic  EYES:  conjunctiva and sclera clear  ENMT: Moist mucous membranes  NECK: Supple, No JVD  NERVOUS SYSTEM:  Alert & Oriented X3, Good concentration; All 4 extremities mobile, no gross sensory deficits.   CHEST/LUNG: Clear to auscultation bilaterally;  HEART: Regular rate and rhythm;   ABDOMEN: Soft, Nontender, Nondistended; Bowel sounds present  EXTREMITIES:  2+ Peripheral Pulses, No clubbing, cyanosis, or edema

## 2021-01-29 NOTE — DISCHARGE NOTE PROVIDER - CARE PROVIDER_API CALL
Carla Moody  [facility]  [address] [place] [address]  Phone: [phone]  Fax: [phone]  Follow Up Time: 1 week

## 2021-01-29 NOTE — PROCEDURAL SAFETY CHECKLIST WITH OR WITHOUT SEDATION - NSDXIMAGING_GEN_ALL_CORE
Chief Complaint:   Chief Complaint   Patient presents with   • Cough     better, mom is using the nose joanna with good results. Slept good last night.         History of Present Illness:  Doing better.   Congestion   Saline and suction   Using the kannan and getting a lot more out   He is breathing better.     Cough- not as much    Not taking as long to take the bottle.    He is latching some now too.     Having hard stools   Like little chasity    Supplementing with kevin soothe   Also giving him probiotics.    Feels that he is bothered by this.         Review of Systems: Review of Systems reviewed with patient and parent. No abnormalities except those listed above.     Allergies: Updated and Reviewed in [facility]    PHYSICAL EXAMINATION:  VITAL SIGNS:   Visit Vitals  Pulse 144   Temp 98.5 °F (36.9 °C) (Rectal)   Resp 32   Wt 4.956 kg        GENERAL APPEARANCE: Well developed, well nourished, in no acute distress.  EYE: The sclerae were anicteric and conjunctivae were pink and moist.  ENT: External inspection of the ears and nose showed no scars, lesions, or masses. Bilateral otic canal and tympanic membranes are clear.  Nasal mucosa, septum and turbinates are normal.  Lips and gums showed normal mucosa. The oral mucosa, hard and soft palate, tongue and posterior pharynx were normal.  NECK: Supple and symmetric, full range of motion.   LYMPH NODES: Anterior and posterior cervical lymph nodes are normal.  Supraclavicular lymph nodes are also normal.  LUNGS: Auscultation of the lungs revealed normal breath sounds without any other adventitious sounds or rubs.  No distress noted, symmetric respiratory effort.    CARDIOVASCULAR: There was a regular rate and rhythm without any murmurs, gallops, rubs.   GASTROINTESTINAL/ABDOMINAL: Soft, non-distended, nontender, active bowel sounds, no masses or hepatosplenomegaly.     Procedures:  None.     Assessment and Plan:  Viral uri   Improving.    Exam is normal.    Reviewed signs  of distress.    reviewed proper use of saline and suction.     Difficulty passing stool    Trial of soy formula   Mom to limit dairy in her diet.         done

## 2021-01-29 NOTE — PROGRESS NOTE ADULT - ASSESSMENT
88 y/o F with PMH of HTN, Dyslipidemia, Pre DM, CAD s/p PCI, PVD, DVT in 2019, CKD stage 3, and Hypothyroidism presented with a newly diagnosed pelvic mass.

## 2021-01-29 NOTE — DISCHARGE NOTE PROVIDER - NSDCCPCAREPLAN_GEN_ALL_CORE_FT
PRINCIPAL DISCHARGE DIAGNOSIS  Diagnosis: Pelvic mass  Assessment and Plan of Treatment: follow up pathology results.  If Dr Moody does not get them in a week please have her call me during business hours and i will try to get them to her.      SECONDARY DISCHARGE DIAGNOSES  Diagnosis: Hypothyroidism  Assessment and Plan of Treatment: continue synthroid.

## 2021-01-29 NOTE — DISCHARGE NOTE NURSING/CASE MANAGEMENT/SOCIAL WORK - PATIENT PORTAL LINK FT
You can access the FollowMyHealth Patient Portal offered by [facility] by registering at the following website: [email or web address]. By joining [facility] [employer]’s FollowMyHealth portal, you will also be able to view your health information using other applications (apps) compatible with our system.

## 2021-01-29 NOTE — DISCHARGE NOTE PROVIDER - DETAILS OF MALNUTRITION DIAGNOSIS/DIAGNOSES
This patient has been assessed with a concern for Malnutrition and was treated during this hospitalization for the following Nutrition diagnosis/diagnoses:     -  01/28/2021: Severe protein-calorie malnutrition   -  01/28/2021: Underweight (BMI < 19)   This patient has been assessed with a concern for Malnutrition and was treated during this hospitalization for the following Nutrition diagnosis/diagnoses:     -  01/28/2021: Severe protein-calorie malnutrition   -  01/28/2021: Underweight (BMI < 19)    This patient has been assessed with a concern for Malnutrition and was treated during this hospitalization for the following Nutrition diagnosis/diagnoses:     -  01/28/2021: Severe protein-calorie malnutrition   -  01/28/2021: Underweight (BMI < 19)

## 2021-01-29 NOTE — PROGRESS NOTE ADULT - PROBLEM SELECTOR PLAN 3
Likely due to the general catabolic state related to her malignancy & anorexia, was previously ambulating with cane because of her chronic B/L lower extremity weakness, now with sacral decubitus, PT eval appreciated, dc home with HCPT

## 2021-01-29 NOTE — DISCHARGE NOTE PROVIDER - NSDCMRMEDTOKEN_GEN_ALL_CORE_FT
amLODIPine 5 mg oral tablet: 1 tab(s) orally once a day  aspirin 81 mg oral tablet: 1 tab(s) orally once a day  levothyroxine 25 mcg (0.025 mg) oral tablet: 1 tab(s) orally once a day  rolling walker with 5 inch wheels:

## 2021-01-30 VITALS
OXYGEN SATURATION: 100 % | SYSTOLIC BLOOD PRESSURE: 120 MMHG | TEMPERATURE: 98 F | HEART RATE: 96 BPM | RESPIRATION RATE: 16 BRPM | DIASTOLIC BLOOD PRESSURE: 72 MMHG

## 2021-01-30 PROCEDURE — 97161 PT EVAL LOW COMPLEX 20 MIN: CPT

## 2021-01-30 PROCEDURE — 86304 IMMUNOASSAY TUMOR CA 125: CPT

## 2021-01-30 PROCEDURE — 88108 CYTOPATH CONCENTRATE TECH: CPT

## 2021-01-30 PROCEDURE — 49083 ABD PARACENTESIS W/IMAGING: CPT

## 2021-01-30 PROCEDURE — 99239 HOSP IP/OBS DSCHRG MGMT >30: CPT

## 2021-01-30 PROCEDURE — 93005 ELECTROCARDIOGRAM TRACING: CPT

## 2021-01-30 PROCEDURE — 83036 HEMOGLOBIN GLYCOSYLATED A1C: CPT

## 2021-01-30 PROCEDURE — 97116 GAIT TRAINING THERAPY: CPT

## 2021-01-30 PROCEDURE — 97530 THERAPEUTIC ACTIVITIES: CPT

## 2021-01-30 PROCEDURE — 88305 TISSUE EXAM BY PATHOLOGIST: CPT

## 2021-01-30 PROCEDURE — 82378 CARCINOEMBRYONIC ANTIGEN: CPT

## 2021-01-30 PROCEDURE — 36415 COLL VENOUS BLD VENIPUNCTURE: CPT

## 2021-01-30 PROCEDURE — 86769 SARS-COV-2 COVID-19 ANTIBODY: CPT

## 2021-01-30 PROCEDURE — 80048 BASIC METABOLIC PNL TOTAL CA: CPT

## 2021-01-30 PROCEDURE — 85027 COMPLETE CBC AUTOMATED: CPT

## 2021-01-30 PROCEDURE — 83735 ASSAY OF MAGNESIUM: CPT

## 2021-01-30 RX ORDER — AMLODIPINE BESYLATE 2.5 MG/1
1 TABLET ORAL
Qty: 30 | Refills: 0
Start: 2021-01-30 | End: 2021-02-28

## 2021-01-30 RX ORDER — LEVOTHYROXINE SODIUM 125 MCG
1 TABLET ORAL
Qty: 30 | Refills: 0
Start: 2021-01-30 | End: 2021-02-28

## 2021-01-30 RX ORDER — ASPIRIN/CALCIUM CARB/MAGNESIUM 324 MG
1 TABLET ORAL
Qty: 0 | Refills: 0 | DISCHARGE

## 2021-01-30 RX ORDER — ASPIRIN/CALCIUM CARB/MAGNESIUM 324 MG
1 TABLET ORAL
Qty: 30 | Refills: 0
Start: 2021-01-30 | End: 2021-02-28

## 2021-01-30 RX ORDER — POLYETHYLENE GLYCOL 3350 17 G/17G
17 POWDER, FOR SOLUTION ORAL ONCE
Refills: 0 | Status: COMPLETED | OUTPATIENT
Start: 2021-01-30 | End: 2021-01-30

## 2021-01-30 RX ADMIN — Medication 81 MILLIGRAM(S): at 12:14

## 2021-01-30 RX ADMIN — Medication 25 MICROGRAM(S): at 06:29

## 2021-01-30 RX ADMIN — POLYETHYLENE GLYCOL 3350 17 GRAM(S): 17 POWDER, FOR SOLUTION ORAL at 12:14

## 2021-01-30 RX ADMIN — HEPARIN SODIUM 5000 UNIT(S): 5000 INJECTION INTRAVENOUS; SUBCUTANEOUS at 06:29

## 2021-01-30 NOTE — CHART NOTE - NSCHARTNOTEFT_GEN_A_CORE
Assessment:     Factors impacting intake: [ ] none [ ] nausea  [ ] vomiting [ ] diarrhea [ ] constipation  [ ]chewing problems [ ] swallowing issues  [ ] other:     Diet Presciption: Diet, Regular:   Supplement Feeding Modality:  Oral  Ensure Clear Cans or Servings Per Day:  1       Frequency:  Three Times a day  Ensure Pudding Cans or Servings Per Day:  1       Frequency:  Daily (01-28-21 @ 10:14)    Intake:     Current Weight: Weight (kg): 42.8 (01-27 @ 21:19), 45.4 (01-26 @ 15:53)  % Weight Change    Pertinent Medications: MEDICATIONS  (STANDING):  amLODIPine   Tablet 5 milliGRAM(s) Oral daily  aspirin enteric coated 81 milliGRAM(s) Oral daily  heparin   Injectable 5000 Unit(s) SubCutaneous every 12 hours  levothyroxine 25 MICROGram(s) Oral daily    MEDICATIONS  (PRN):    Pertinent Labs: 01-28 Na145 mmol/L Glu 97 mg/dL K+ 4.5 mmol/L Cr  1.30 mg/dL BUN 36 mg/dL<H> 01-27 Phos 3.6 mg/dL 01-27 Alb 2.8 g/dL<L>     CAPILLARY BLOOD GLUCOSE        Skin:     Estimated Needs:   [ ] no change since previous assessment  [ ] recalculated:     Previous Nutrition Diagnosis:   [ ] Inadequate Energy Intake [ ]Inadequate Oral Intake [ ] Excessive Energy Intake   [ ] Underweight [ ] Increased Nutrient Needs [ ] Overweight/Obesity   [ ] Altered GI Function [ ] Unintended Weight Loss [ ] Food & Nutrition Related Knowledge Deficit [ ] Malnutrition     Nutrition Diagnosis is [ ] ongoing  [ ] resolved [ ] not applicable     New Nutrition Diagnosis: [ ] not applicable       Interventions:   Recommend  [ ] Change Diet To:  [ ] Nutrition Supplement  [ ] Nutrition Support  [ ] Other:     Monitoring and Evaluation:   [ ] PO intake [ x ] Tolerance to diet prescription [ x ] weights [ x ] labs[ x ] follow up per protocol  [ ] other: Assessment: 89 y/o F with PMH of HTN, Dyslipidemia, Pre DM, CAD s/p PCI, PVD, DVT in 2019, CKD stage 3, and Hypothyroidism presented with a newly diagnosed pelvic mass, malginant. Pt reports she is eating ~ 25% of meal but drinks all her Ensure Clear, isn't eating the Ensure Pudding. She complaints food is cold when it gets to her. Told pt, nsg would happily reheat for her. She report she has not had a BM since admission; DANIELLA Garcia made aware . After resident seen, noticed likely d/c home today with PCP  .        Factors impacting intake: [ ] none [ ] nausea  [ ] vomiting [ ] diarrhea [ ] constipation  [ ]chewing problems [ ] swallowing issues  [X ] other: needs tray set up assist, c/o food being cold ( see above)     Diet Prescription: Diet, Regular:   Supplement Feeding Modality:  Oral  Ensure Clear Cans or Servings Per Day:  1       Frequency:  Three Times a day  Ensure Pudding Cans or Servings Per Day:  1       Frequency:  Daily (01-28-21 @ 10:14)    Intake: 25% of meals, 100% of Ensure Clear suppl.    Current Weight: Weight (kg): 42.8 (01-27 @ 21:19), 45.4 (01-26 @ 15:53)  % Weight Change: no new wt     Pertinent Medications: MEDICATIONS  (STANDING):  amLODIPine   Tablet 5 milliGRAM(s) Oral daily  aspirin enteric coated 81 milliGRAM(s) Oral daily  heparin   Injectable 5000 Unit(s) SubCutaneous every 12 hours  levothyroxine 25 MICROGram(s) Oral daily    MEDICATIONS  (PRN):    Pertinent Labs: 01-28 Na145 mmol/L Glu 97 mg/dL K+ 4.5 mmol/L Cr  1.30 mg/dL BUN 36 mg/dL<H> 01-27 Phos 3.6 mg/dL 01-27 Alb 2.8 g/dL<L>     CAPILLARY BLOOD GLUCOSE        Skin: stg II PI to sacrum     Estimated Needs:   [X ] no change since previous assessment  [ ] recalculated:     Previous Nutrition Diagnosis:   [ ] Inadequate Energy Intake [ ]Inadequate Oral Intake [ ] Excessive Energy Intake   [ ] Underweight [ ] Increased Nutrient Needs [ ] Overweight/Obesity   [ ] Altered GI Function [ ] Unintended Weight Loss [ ] Food & Nutrition Related Knowledge Deficit [X ] Malnutrition     Nutrition Diagnosis is [X ] ongoing  [ ] resolved [ ] not applicable     New Nutrition Diagnosis: [ ] altered GI function ( constipation)        Interventions:   Recommend  [ ] Change Diet To:  [ ] Nutrition Supplement  [ ] Nutrition Support  [ X] Other: d/c home on a regular diet with Ensure Supplement TID ( pt can take similar product at home ), focus on protein and fiber rich foods, should be on mvi with minerals 1 tab per day and vit C 500 mg daily to aid in wound healing    Monitoring and Evaluation:   [ ] PO intake [ x ] Tolerance to diet prescription [ x ] weights [ x ] labs[ x ] follow up per protocol  [ ] other:

## 2021-02-04 DIAGNOSIS — R73.03 PREDIABETES: ICD-10-CM

## 2021-02-04 DIAGNOSIS — J90 PLEURAL EFFUSION, NOT ELSEWHERE CLASSIFIED: ICD-10-CM

## 2021-02-04 DIAGNOSIS — R62.7 ADULT FAILURE TO THRIVE: ICD-10-CM

## 2021-02-04 DIAGNOSIS — C26.9 MALIGNANT NEOPLASM OF ILL-DEFINED SITES WITHIN THE DIGESTIVE SYSTEM: ICD-10-CM

## 2021-02-04 DIAGNOSIS — E03.9 HYPOTHYROIDISM, UNSPECIFIED: ICD-10-CM

## 2021-02-04 DIAGNOSIS — R18.8 OTHER ASCITES: ICD-10-CM

## 2021-02-04 DIAGNOSIS — N17.9 ACUTE KIDNEY FAILURE, UNSPECIFIED: ICD-10-CM

## 2021-02-04 DIAGNOSIS — I27.20 PULMONARY HYPERTENSION, UNSPECIFIED: ICD-10-CM

## 2021-02-04 DIAGNOSIS — N18.30 CHRONIC KIDNEY DISEASE, STAGE 3 UNSPECIFIED: ICD-10-CM

## 2021-02-04 DIAGNOSIS — N39.0 URINARY TRACT INFECTION, SITE NOT SPECIFIED: ICD-10-CM

## 2021-02-04 DIAGNOSIS — I25.10 ATHEROSCLEROTIC HEART DISEASE OF NATIVE CORONARY ARTERY WITHOUT ANGINA PECTORIS: ICD-10-CM

## 2021-02-04 DIAGNOSIS — I07.1 RHEUMATIC TRICUSPID INSUFFICIENCY: ICD-10-CM

## 2021-02-04 DIAGNOSIS — E78.5 HYPERLIPIDEMIA, UNSPECIFIED: ICD-10-CM

## 2021-02-04 DIAGNOSIS — R19.00 INTRA-ABDOMINAL AND PELVIC SWELLING, MASS AND LUMP, UNSPECIFIED SITE: ICD-10-CM

## 2021-02-04 DIAGNOSIS — I73.9 PERIPHERAL VASCULAR DISEASE, UNSPECIFIED: ICD-10-CM

## 2021-02-04 DIAGNOSIS — I12.9 HYPERTENSIVE CHRONIC KIDNEY DISEASE WITH STAGE 1 THROUGH STAGE 4 CHRONIC KIDNEY DISEASE, OR UNSPECIFIED CHRONIC KIDNEY DISEASE: ICD-10-CM

## 2021-02-08 PROBLEM — Z00.00 ENCOUNTER FOR PREVENTIVE HEALTH EXAMINATION: Status: ACTIVE | Noted: 2021-02-08

## 2021-02-10 ENCOUNTER — APPOINTMENT (OUTPATIENT)
Dept: GYNECOLOGIC ONCOLOGY | Facility: CLINIC | Age: 86
End: 2021-02-10
Payer: MEDICARE

## 2021-02-10 ENCOUNTER — NON-APPOINTMENT (OUTPATIENT)
Age: 86
End: 2021-02-10

## 2021-02-10 VITALS — SYSTOLIC BLOOD PRESSURE: 98 MMHG | WEIGHT: 94 LBS | HEART RATE: 74 BPM | DIASTOLIC BLOOD PRESSURE: 54 MMHG

## 2021-02-10 DIAGNOSIS — I50.9 HEART FAILURE, UNSPECIFIED: ICD-10-CM

## 2021-02-10 DIAGNOSIS — N18.30 CHRONIC KIDNEY DISEASE, STAGE 3 UNSPECIFIED: ICD-10-CM

## 2021-02-10 DIAGNOSIS — K21.9 GASTRO-ESOPHAGEAL REFLUX DISEASE W/OUT ESOPHAGITIS: ICD-10-CM

## 2021-02-10 DIAGNOSIS — I73.9 PERIPHERAL VASCULAR DISEASE, UNSPECIFIED: ICD-10-CM

## 2021-02-10 DIAGNOSIS — Z80.49 FAMILY HISTORY OF MALIGNANT NEOPLASM OF OTHER GENITAL ORGANS: ICD-10-CM

## 2021-02-10 DIAGNOSIS — N28.1 CYST OF KIDNEY, ACQUIRED: ICD-10-CM

## 2021-02-10 DIAGNOSIS — I10 ESSENTIAL (PRIMARY) HYPERTENSION: ICD-10-CM

## 2021-02-10 DIAGNOSIS — R19.00 INTRA-ABDOMINAL AND PELVIC SWELLING, MASS AND LUMP, UNSPECIFIED SITE: ICD-10-CM

## 2021-02-10 DIAGNOSIS — I25.10 ATHEROSCLEROTIC HEART DISEASE OF NATIVE CORONARY ARTERY W/OUT ANGINA PECTORIS: ICD-10-CM

## 2021-02-10 DIAGNOSIS — M81.0 AGE-RELATED OSTEOPOROSIS W/OUT CURRENT PATHOLOGICAL FRACTURE: ICD-10-CM

## 2021-02-10 DIAGNOSIS — D64.9 ANEMIA, UNSPECIFIED: ICD-10-CM

## 2021-02-10 DIAGNOSIS — Z60.2 PROBLEMS RELATED TO LIVING ALONE: ICD-10-CM

## 2021-02-10 DIAGNOSIS — R97.0 ELEVATED CARCINOEMBRYONIC ANTIGEN [CEA]: ICD-10-CM

## 2021-02-10 DIAGNOSIS — I51.89 OTHER ILL-DEFINED HEART DISEASES: ICD-10-CM

## 2021-02-10 DIAGNOSIS — A15.0 TUBERCULOSIS OF LUNG: ICD-10-CM

## 2021-02-10 DIAGNOSIS — E03.9 HYPOTHYROIDISM, UNSPECIFIED: ICD-10-CM

## 2021-02-10 DIAGNOSIS — R18.0 MALIGNANT ASCITES: ICD-10-CM

## 2021-02-10 DIAGNOSIS — E53.8 DEFICIENCY OF OTHER SPECIFIED B GROUP VITAMINS: ICD-10-CM

## 2021-02-10 DIAGNOSIS — R97.1 ELEVATED CANCER ANTIGEN 125 [CA 125]: ICD-10-CM

## 2021-02-10 DIAGNOSIS — D46.9 MYELODYSPLASTIC SYNDROME, UNSPECIFIED: ICD-10-CM

## 2021-02-10 PROCEDURE — 99205 OFFICE O/P NEW HI 60 MIN: CPT

## 2021-02-10 PROCEDURE — 99072 ADDL SUPL MATRL&STAF TM PHE: CPT

## 2021-02-10 RX ORDER — CYANOCOBALAMIN 1000 UG/ML
1000 INJECTION INTRAMUSCULAR; SUBCUTANEOUS
Refills: 0 | Status: COMPLETED | COMMUNITY
End: 2021-02-10

## 2021-02-10 RX ORDER — MEGESTROL ACETATE 40 MG
TABLET ORAL
Refills: 0 | Status: COMPLETED | COMMUNITY
End: 2021-02-10

## 2021-02-10 RX ORDER — CHLORHEXIDINE GLUCONATE 4 %
325 (65 FE) LIQUID (ML) TOPICAL
Refills: 0 | Status: COMPLETED | COMMUNITY
End: 2021-02-10

## 2021-02-10 RX ORDER — AMLODIPINE BESYLATE 5 MG/1
5 TABLET ORAL
Refills: 0 | Status: ACTIVE | COMMUNITY

## 2021-02-10 RX ORDER — CYPROHEPTADINE HCL 4 MG
4 TABLET ORAL
Refills: 0 | Status: COMPLETED | COMMUNITY
End: 2021-02-10

## 2021-02-10 RX ORDER — CLOPIDOGREL 75 MG/1
75 TABLET, FILM COATED ORAL
Refills: 0 | Status: COMPLETED | COMMUNITY
End: 2021-02-10

## 2021-02-10 RX ORDER — ALENDRONATE SODIUM 70 MG/1
70 TABLET ORAL
Refills: 0 | Status: COMPLETED | COMMUNITY
End: 2021-02-10

## 2021-02-10 RX ORDER — ASPIRIN 81 MG
81 TABLET, DELAYED RELEASE (ENTERIC COATED) ORAL
Refills: 0 | Status: ACTIVE | COMMUNITY

## 2021-02-10 RX ORDER — DICLOFENAC SODIUM 10 MG/G
1 GEL TOPICAL
Refills: 0 | Status: COMPLETED | COMMUNITY
End: 2021-02-10

## 2021-02-10 RX ORDER — LEVOTHYROXINE SODIUM 0.03 MG/1
25 TABLET ORAL
Refills: 0 | Status: ACTIVE | COMMUNITY

## 2021-02-10 RX ORDER — METOPROLOL SUCCINATE 50 MG/1
50 TABLET, EXTENDED RELEASE ORAL
Refills: 0 | Status: COMPLETED | COMMUNITY
End: 2021-02-10

## 2021-02-10 RX ORDER — SIMVASTATIN 10 MG/1
10 TABLET, FILM COATED ORAL
Refills: 0 | Status: COMPLETED | COMMUNITY
End: 2021-02-10

## 2021-02-10 SDOH — SOCIAL STABILITY - SOCIAL INSECURITY: PROBLEMS RELATED TO LIVING ALONE: Z60.2

## 2021-02-12 ENCOUNTER — RESULT REVIEW (OUTPATIENT)
Age: 86
End: 2021-02-12

## 2021-02-16 ENCOUNTER — OUTPATIENT (OUTPATIENT)
Dept: OUTPATIENT SERVICES | Facility: HOSPITAL | Age: 86
LOS: 1 days | Discharge: HOME | End: 2021-02-16

## 2021-02-16 DIAGNOSIS — Z96.649 PRESENCE OF UNSPECIFIED ARTIFICIAL HIP JOINT: Chronic | ICD-10-CM

## 2021-02-16 DIAGNOSIS — C56.9 MALIGNANT NEOPLASM OF UNSPECIFIED OVARY: ICD-10-CM

## 2021-02-17 ENCOUNTER — APPOINTMENT (OUTPATIENT)
Dept: HEMATOLOGY ONCOLOGY | Facility: CLINIC | Age: 86
End: 2021-02-17
Payer: MEDICARE

## 2021-02-17 ENCOUNTER — RESULT REVIEW (OUTPATIENT)
Age: 86
End: 2021-02-17

## 2021-02-17 VITALS
WEIGHT: 93.48 LBS | HEART RATE: 90 BPM | TEMPERATURE: 96.4 F | HEIGHT: 62.2 IN | SYSTOLIC BLOOD PRESSURE: 111 MMHG | DIASTOLIC BLOOD PRESSURE: 58 MMHG | BODY MASS INDEX: 16.98 KG/M2 | RESPIRATION RATE: 14 BRPM

## 2021-02-17 DIAGNOSIS — C78.6 SECONDARY MALIGNANT NEOPLASM OF RETROPERITONEUM AND PERITONEUM: ICD-10-CM

## 2021-02-17 DIAGNOSIS — C80.1 SECONDARY MALIGNANT NEOPLASM OF RETROPERITONEUM AND PERITONEUM: ICD-10-CM

## 2021-02-17 LAB
BASOPHILS # BLD AUTO: 0 K/UL — SIGNIFICANT CHANGE UP (ref 0–0.2)
BASOPHILS NFR BLD AUTO: 0 % — SIGNIFICANT CHANGE UP (ref 0–2)
EOSINOPHIL # BLD AUTO: 0 K/UL — SIGNIFICANT CHANGE UP (ref 0–0.5)
EOSINOPHIL NFR BLD AUTO: 0 % — SIGNIFICANT CHANGE UP (ref 0–6)
HCT VFR BLD CALC: 28.4 % — LOW (ref 34.5–45)
HGB BLD-MCNC: 9.1 G/DL — LOW (ref 11.5–15.5)
LYMPHOCYTES # BLD AUTO: 0.42 K/UL — LOW (ref 1–3.3)
LYMPHOCYTES # BLD AUTO: 5 % — LOW (ref 13–44)
MCHC RBC-ENTMCNC: 32 G/DL — SIGNIFICANT CHANGE UP (ref 32–36)
MCHC RBC-ENTMCNC: 33 PG — SIGNIFICANT CHANGE UP (ref 27–34)
MCV RBC AUTO: 102.9 FL — HIGH (ref 80–100)
MONOCYTES # BLD AUTO: 0 K/UL — SIGNIFICANT CHANGE UP (ref 0–0.9)
MONOCYTES NFR BLD AUTO: 0 % — LOW (ref 2–14)
NEUTROPHILS # BLD AUTO: 8 K/UL — HIGH (ref 1.8–7.4)
NEUTROPHILS NFR BLD AUTO: 95 % — HIGH (ref 43–77)
NRBC # BLD: 0 /100 — SIGNIFICANT CHANGE UP (ref 0–0)
NRBC # BLD: SIGNIFICANT CHANGE UP /100 WBCS (ref 0–0)
PLAT MORPH BLD: NORMAL — SIGNIFICANT CHANGE UP
PLATELET # BLD AUTO: 232 K/UL — SIGNIFICANT CHANGE UP (ref 150–400)
RBC # BLD: 2.76 M/UL — LOW (ref 3.8–5.2)
RBC # FLD: 16.6 % — HIGH (ref 10.3–14.5)
RBC BLD AUTO: SIGNIFICANT CHANGE UP
WBC # BLD: 8.42 K/UL — SIGNIFICANT CHANGE UP (ref 3.8–10.5)
WBC # FLD AUTO: 8.42 K/UL — SIGNIFICANT CHANGE UP (ref 3.8–10.5)

## 2021-02-17 PROCEDURE — 99205 OFFICE O/P NEW HI 60 MIN: CPT

## 2021-02-17 PROCEDURE — 99072 ADDL SUPL MATRL&STAF TM PHE: CPT

## 2021-02-18 PROBLEM — C78.6 PERITONEAL CARCINOMATOSIS: Status: ACTIVE | Noted: 2021-02-10

## 2021-02-18 NOTE — CONSULT LETTER
[Dear  ___] : Dear  [unfilled], [Consult Letter:] : I had the pleasure of evaluating your patient, [unfilled]. [Consult Closing:] : Thank you very much for allowing me to participate in the care of this patient.  If you have any questions, please do not hesitate to contact me. [Sincerely,] : Sincerely, [DrRichard  ___] : Dr. GRIGSBY [DrRichard ___] : Dr. GRIGSBY

## 2021-02-18 NOTE — HISTORY OF PRESENT ILLNESS
[de-identified] : 89 yo. female with newly diagnosed carcinomatosis, not a surgical candidate, here to discuss treatment options.\par \par he patient explains that she presented to [facility] on 1/27/2021 with 2 months of left leg pain, generalized weakness, anorexia, and ongoing weight loss. CT abdomen & pelvis with contrast showed a large pelvic mass with extensive peritoneal carcinomatosis and large amount of ascites. She was seen by HEME/ONC at [facility] and offered IR paracentesis. Before deciding to proceed with the procedure, she was transferred to [facility] due to bed availability. She then underwent US guided paracentesis where 3785 ml of serous fluid was removed. Fluid cytology concerning for malignancy. Additionally, Ca 125 and CEA both elevated. She was discharged from the hospital on 1/30/2021 with instructions to follow-up with [facility]. \par \par 01/26/2021, CT CHEST, ABDOMEN AND PELVIS IC, PACS  \par LUNGS AND LARGE AIRWAYS: Partial right upper lobectomy. Changes of prior granulomatous disease. Bibasilar atelectasis.\par PLEURA: Small bilateral pleural effusions.\par VESSELS: Atherosclerotic change of the thoracic aorta without aneurysm or dissection. Main pulmonary artery is enlarged. Coronary artery calcifications. Stent in the left anterior descending coronary artery.\par HEART: Right atrial enlargement. No pericardial effusion.\par MEDIASTINUM AND VANDANA: No enlarged lymph node measuring greater than 10 mm in short axis. There is a small hiatal hernia containing loculated ascites.\par BILE DUCTS: Mild to moderate intrahepatic biliary ductal dilatation. Extrahepatic biliary dilatation, common duct measures up to 15 mm.\par GALLBLADDER: Distended with cholelithiasis.\par PANCREAS: Borderline mild pancreatic ductal dilatation. No pancreatic mass identified on this scan.\par KIDNEYS/URETERS: Symmetric renal cortical atrophy.\par BLADDER: Decompressed, limiting evaluation.\par REPRODUCTIVE ORGANS: Large, infiltrative cystic and solid mass in the pelvis, encompasses the uterus and adnexa, overall measures 11.9 x 9.6 x 5.9 cm. This mass is inseparable from multiple loops of small and large bowel.\par BOWEL: No bowel obstruction. Appendix is not well seen, no secondary findings of acute appendicitis.\par PERITONEUM: Diffuse, minimally complex and partially loculated ascites with extensive peritoneal carcinomatosis. 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 \par \par Patient remains weak and has no appetite. She is losing weight and continues to have confusion intermittently.\par \par  [de-identified] : Patient lives alone, she has one daughter who plans to move in with her.\par She has no history of smoking or drinking.\par Patient appropriately answering to questions directed at her.

## 2021-02-18 NOTE — REASON FOR VISIT
[Initial Consultation] : an initial consultation [Other: _____] : [unfilled] [FreeTextEntry2] : malignant ascites.

## 2021-02-18 NOTE — PHYSICAL EXAM
[Ambulatory and capable of all self care but unable to carry out any work activities] : Status 2- Ambulatory and capable of all self care but unable to carry out any work activities. Up and about more than 50% of waking hours [Cachectic] : cachectic [Normal] : affect appropriate [de-identified] : Mildly distended, NT, BS+. Ascites present.

## 2021-02-18 NOTE — ASSESSMENT
[Palliative] : Goals of care discussed with patient: Palliative [Palliative Care Plan] : patient was apprised of terminal prognosis of 6 months or less. Referral made to Hospice or Palliative Care Service

## 2021-02-22 LAB
ALBUMIN SERPL ELPH-MCNC: 3 G/DL
ALP BLD-CCNC: 72 U/L
ALT SERPL-CCNC: 6 U/L
ANION GAP SERPL CALC-SCNC: 20 MMOL/L
APTT BLD: 29.7 SEC
AST SERPL-CCNC: 23 U/L
BILIRUB SERPL-MCNC: 0.5 MG/DL
BUN SERPL-MCNC: 38 MG/DL
CALCIUM SERPL-MCNC: 9.2 MG/DL
CANCER AG125 SERPL-ACNC: 6959 U/ML
CEA SERPL-MCNC: 8.5 NG/ML
CHLORIDE SERPL-SCNC: 110 MMOL/L
CO2 SERPL-SCNC: 16 MMOL/L
CREAT SERPL-MCNC: 1.69 MG/DL
GLUCOSE SERPL-MCNC: 116 MG/DL
HAV IGM SER QL: NONREACTIVE
HBV CORE IGM SER QL: NONREACTIVE
HBV SURFACE AG SER QL: NONREACTIVE
HCV AB SER QL: NONREACTIVE
HCV S/CO RATIO: 0.45 S/CO
INR PPP: 1.3 RATIO
MAGNESIUM SERPL-MCNC: 2.4 MG/DL
POTASSIUM SERPL-SCNC: 4.1 MMOL/L
PROT SERPL-MCNC: 7.1 G/DL
PT BLD: 15.3 SEC
SODIUM SERPL-SCNC: 146 MMOL/L

## 2021-02-24 ENCOUNTER — INPATIENT (INPATIENT)
Facility: HOSPITAL | Age: 86
LOS: 0 days | Discharge: HOSPICE | End: 2021-02-25
Attending: INTERNAL MEDICINE | Admitting: INTERNAL MEDICINE
Payer: MEDICARE

## 2021-02-24 VITALS
SYSTOLIC BLOOD PRESSURE: 114 MMHG | DIASTOLIC BLOOD PRESSURE: 64 MMHG | HEIGHT: 62 IN | TEMPERATURE: 97 F | RESPIRATION RATE: 20 BRPM | HEART RATE: 81 BPM | OXYGEN SATURATION: 98 % | WEIGHT: 119.93 LBS

## 2021-02-24 DIAGNOSIS — Z96.649 PRESENCE OF UNSPECIFIED ARTIFICIAL HIP JOINT: Chronic | ICD-10-CM

## 2021-02-24 LAB
ALBUMIN SERPL ELPH-MCNC: 1.8 G/DL — LOW (ref 3.3–5)
ALP SERPL-CCNC: 76 U/L — SIGNIFICANT CHANGE UP (ref 40–120)
ALT FLD-CCNC: 13 U/L — SIGNIFICANT CHANGE UP (ref 12–78)
ANION GAP SERPL CALC-SCNC: 13 MMOL/L — SIGNIFICANT CHANGE UP (ref 5–17)
AST SERPL-CCNC: 49 U/L — HIGH (ref 15–37)
BASOPHILS # BLD AUTO: 0.01 K/UL — SIGNIFICANT CHANGE UP (ref 0–0.2)
BASOPHILS NFR BLD AUTO: 0.1 % — SIGNIFICANT CHANGE UP (ref 0–2)
BILIRUB SERPL-MCNC: 0.6 MG/DL — SIGNIFICANT CHANGE UP (ref 0.2–1.2)
BUN SERPL-MCNC: 75 MG/DL — HIGH (ref 7–23)
CALCIUM SERPL-MCNC: 8.7 MG/DL — SIGNIFICANT CHANGE UP (ref 8.5–10.1)
CHLORIDE SERPL-SCNC: 129 MMOL/L — HIGH (ref 96–108)
CO2 SERPL-SCNC: 17 MMOL/L — LOW (ref 22–31)
CREAT SERPL-MCNC: 2.12 MG/DL — HIGH (ref 0.5–1.3)
EOSINOPHIL # BLD AUTO: 0 K/UL — SIGNIFICANT CHANGE UP (ref 0–0.5)
EOSINOPHIL NFR BLD AUTO: 0 % — SIGNIFICANT CHANGE UP (ref 0–6)
GLUCOSE SERPL-MCNC: 154 MG/DL — HIGH (ref 70–99)
HCT VFR BLD CALC: 29.2 % — LOW (ref 34.5–45)
HGB BLD-MCNC: 9.1 G/DL — LOW (ref 11.5–15.5)
IMM GRANULOCYTES NFR BLD AUTO: 0.5 % — SIGNIFICANT CHANGE UP (ref 0–1.5)
LYMPHOCYTES # BLD AUTO: 0.73 K/UL — LOW (ref 1–3.3)
LYMPHOCYTES # BLD AUTO: 8.9 % — LOW (ref 13–44)
MCHC RBC-ENTMCNC: 31.2 GM/DL — LOW (ref 32–36)
MCHC RBC-ENTMCNC: 31.7 PG — SIGNIFICANT CHANGE UP (ref 27–34)
MCV RBC AUTO: 101.7 FL — HIGH (ref 80–100)
MONOCYTES # BLD AUTO: 0.35 K/UL — SIGNIFICANT CHANGE UP (ref 0–0.9)
MONOCYTES NFR BLD AUTO: 4.3 % — SIGNIFICANT CHANGE UP (ref 2–14)
NEUTROPHILS # BLD AUTO: 7.03 K/UL — SIGNIFICANT CHANGE UP (ref 1.8–7.4)
NEUTROPHILS NFR BLD AUTO: 86.2 % — HIGH (ref 43–77)
NRBC # BLD: 0 /100 WBCS — SIGNIFICANT CHANGE UP (ref 0–0)
PLATELET # BLD AUTO: 141 K/UL — LOW (ref 150–400)
POTASSIUM SERPL-MCNC: 4 MMOL/L — SIGNIFICANT CHANGE UP (ref 3.5–5.3)
POTASSIUM SERPL-SCNC: 4 MMOL/L — SIGNIFICANT CHANGE UP (ref 3.5–5.3)
PROT SERPL-MCNC: 6.8 GM/DL — SIGNIFICANT CHANGE UP (ref 6–8.3)
RAPID RVP RESULT: SIGNIFICANT CHANGE UP
RBC # BLD: 2.87 M/UL — LOW (ref 3.8–5.2)
RBC # FLD: 18.3 % — HIGH (ref 10.3–14.5)
SARS-COV-2 RNA SPEC QL NAA+PROBE: SIGNIFICANT CHANGE UP
SODIUM SERPL-SCNC: 159 MMOL/L — HIGH (ref 135–145)
WBC # BLD: 8.16 K/UL — SIGNIFICANT CHANGE UP (ref 3.8–10.5)
WBC # FLD AUTO: 8.16 K/UL — SIGNIFICANT CHANGE UP (ref 3.8–10.5)

## 2021-02-24 PROCEDURE — 99223 1ST HOSP IP/OBS HIGH 75: CPT

## 2021-02-24 PROCEDURE — 71045 X-RAY EXAM CHEST 1 VIEW: CPT | Mod: 26

## 2021-02-24 PROCEDURE — 93010 ELECTROCARDIOGRAM REPORT: CPT

## 2021-02-24 PROCEDURE — 93925 LOWER EXTREMITY STUDY: CPT | Mod: 26

## 2021-02-24 PROCEDURE — 99285 EMERGENCY DEPT VISIT HI MDM: CPT

## 2021-02-24 PROCEDURE — 93970 EXTREMITY STUDY: CPT | Mod: 26

## 2021-02-24 RX ORDER — ASPIRIN/CALCIUM CARB/MAGNESIUM 324 MG
81 TABLET ORAL DAILY
Refills: 0 | Status: DISCONTINUED | OUTPATIENT
Start: 2021-02-24 | End: 2021-02-25

## 2021-02-24 RX ORDER — SENNA PLUS 8.6 MG/1
2 TABLET ORAL AT BEDTIME
Refills: 0 | Status: DISCONTINUED | OUTPATIENT
Start: 2021-02-24 | End: 2021-02-25

## 2021-02-24 RX ORDER — APIXABAN 2.5 MG/1
2.5 TABLET, FILM COATED ORAL
Refills: 0 | Status: DISCONTINUED | OUTPATIENT
Start: 2021-02-24 | End: 2021-02-25

## 2021-02-24 RX ORDER — SODIUM CHLORIDE 9 MG/ML
500 INJECTION INTRAMUSCULAR; INTRAVENOUS; SUBCUTANEOUS ONCE
Refills: 0 | Status: DISCONTINUED | OUTPATIENT
Start: 2021-02-24 | End: 2021-02-24

## 2021-02-24 RX ORDER — MORPHINE SULFATE 50 MG/1
2 CAPSULE, EXTENDED RELEASE ORAL EVERY 4 HOURS
Refills: 0 | Status: DISCONTINUED | OUTPATIENT
Start: 2021-02-24 | End: 2021-02-25

## 2021-02-24 RX ORDER — LEVOTHYROXINE SODIUM 125 MCG
25 TABLET ORAL DAILY
Refills: 0 | Status: DISCONTINUED | OUTPATIENT
Start: 2021-02-24 | End: 2021-02-25

## 2021-02-24 RX ORDER — OXYCODONE AND ACETAMINOPHEN 5; 325 MG/1; MG/1
1 TABLET ORAL EVERY 4 HOURS
Refills: 0 | Status: DISCONTINUED | OUTPATIENT
Start: 2021-02-24 | End: 2021-02-24

## 2021-02-24 RX ORDER — SODIUM CHLORIDE 9 MG/ML
1000 INJECTION, SOLUTION INTRAVENOUS
Refills: 0 | Status: DISCONTINUED | OUTPATIENT
Start: 2021-02-24 | End: 2021-02-25

## 2021-02-24 RX ORDER — SODIUM CHLORIDE 9 MG/ML
1000 INJECTION, SOLUTION INTRAVENOUS
Refills: 0 | Status: DISCONTINUED | OUTPATIENT
Start: 2021-02-24 | End: 2021-02-24

## 2021-02-24 RX ORDER — POLYETHYLENE GLYCOL 3350 17 G/17G
17 POWDER, FOR SOLUTION ORAL DAILY
Refills: 0 | Status: DISCONTINUED | OUTPATIENT
Start: 2021-02-24 | End: 2021-02-25

## 2021-02-24 RX ORDER — ENOXAPARIN SODIUM 100 MG/ML
30 INJECTION SUBCUTANEOUS DAILY
Refills: 0 | Status: DISCONTINUED | OUTPATIENT
Start: 2021-02-24 | End: 2021-02-24

## 2021-02-24 RX ADMIN — MORPHINE SULFATE 2 MILLIGRAM(S): 50 CAPSULE, EXTENDED RELEASE ORAL at 21:35

## 2021-02-24 NOTE — ED PROVIDER NOTE - CONSTITUTIONAL, MLM
normal... Awake, alert, oriented to person, place, time/situation. Pt is weak appearing but able to speak and follow commands.

## 2021-02-24 NOTE — ED PROVIDER NOTE - SECONDARY DIAGNOSIS.
Ovarian cancer, bilateral CKD (chronic kidney disease) stage 3, GFR 30-59 ml/min DVT (deep venous thrombosis)

## 2021-02-24 NOTE — ED PROVIDER NOTE - PROGRESS NOTE DETAILS
Mayra from [facility] called in, states that she was supposed to go to the pt's home today for an evaluation for home hospice, however pt was sent into the ER for evaluation, contact number is : [phone]

## 2021-02-24 NOTE — ED ADULT NURSE NOTE - OBJECTIVE STATEMENT
Patient received at bed 12. Patient is complaining of generalized weakness. Patient is A&Ox2. Patient denies N/V/D. Patient denies headache and dizziness. Unable to get a full history as the patient does not answer questions completely. Patient has a history of ovarian cancer bilaterally and was on chemo and radiation. Patient received at bed 12. Patient is complaining of generalized weakness. Patient is A&Ox2. Patient denies N/V/D. Patient denies headache and dizziness. Unable to get a full history as the patient does not answer questions completely. Patient has a history of ovarian cancer bilaterally and was on chemo and radiation. pt with b/l feet  purple discoloration. + pulses, MD aware.

## 2021-02-24 NOTE — ED ADULT NURSE NOTE - NSIMPLEMENTINTERV_GEN_ALL_ED
Implemented All Fall with Harm Risk Interventions:  [place] to call system. Call bell, personal items and telephone within reach. Instruct patient to call for assistance. Room bathroom lighting operational. Non-slip footwear when patient is off stretcher. Physically safe environment: no spills, clutter or unnecessary equipment. Stretcher in lowest position, wheels locked, appropriate side rails in place. Provide visual cue, wrist band, yellow gown, etc. Monitor gait and stability. Monitor for mental status changes and reorient to person, place, and time. Review medications for side effects contributing to fall risk. Reinforce activity limits and safety measures with patient and family. Provide visual clues: red socks.

## 2021-02-24 NOTE — H&P ADULT - HISTORY OF PRESENT ILLNESS
89 year old female PMH of HTN, CKD III, PVD, advanced stage endometrial cancer diagnosed 1/21/21 presents to the ED for weakness. Pt daughter Bella Baker was contacted, [phone]. She reports that since her diagnosis in January pt has been rapidly declining. Pt hasn't eaten since Saturday and has taken in minimal liquids. Pt is currently working with hospice to setup home hospice. Reports since yesterday noticed discoloration to toes. Pt was moving throughout the night, and this morning noted discoloration to fingers, so EMS was called. Denies fever/chills, CP, abdominal pain, SOB, N/V/D. Nurse aide at home as well. 89 year old female PMH of HTN, CKD III, PVD, advanced stage GYN cancer diagnosed 1/21/21 presents to the ED for weakness. Pt daughter Bella Baker was contacted, [phone]. She reports that since her diagnosis in January pt has been rapidly declining. Pt hasn't eaten since Saturday and has taken in minimal liquids. Pt is currently working with hospice to setup home hospice. Reports since yesterday noticed discoloration to toes. Pt was moving throughout the night, and this morning noted discoloration to fingers, so EMS was called. Denies fever/chills, CP, abdominal pain, SOB, N/V/D. Nurse aide at home as well.

## 2021-02-24 NOTE — ED ADULT NURSE NOTE - CHIEF COMPLAINT
Problem:  CARE  Goal: Vital signs are medically acceptable   [id number] by Lydia Sears RN  Outcome: Ongoing  Note: Vitals stable       Problem:  CARE  Goal: Thermoregulation maintained greater than 97/less than 99.4 Ax   2187 by Lydia Sears RN  Outcome: Ongoing  Note: Temp stable; patient swaddled in blanket       Problem:  CARE  Goal: Infant exhibits minimal/reduced signs of pain/discomfort  [id number] by Lydia Sears RN  Outcome: Ongoing  Note: Infant does not exhibit pain/discomfort. Infant soothes easily. Problem:  CARE  Goal: Infant is maintained in safe environment   6958 by Lydia Sears RN  Outcome: Ongoing  Note: Wrist and ankle ID bands and HUGS bands remain on . Problem:  CARE  Goal: Baby is with Mother and family   456 by Lydia Sears RN  Outcome: Ongoing  Note: Infant rooming in with family     Problem: Discharge Planning:  Goal: Discharged to appropriate level of care  Description: Discharged to appropriate level of care   2298 by Lydia Sears RN  Outcome: Ongoing  Note: Working towards discharge home with family; needs addressed with mother; ducks in a row discussed        Problem: Infant Care:  Goal: Will show no infection signs and symptoms  Description: Will show no infection signs and symptoms  [id number] by Lydia Sears RN  Outcome: Ongoing  Note: Vital WNL; no s/sx of infection noted       Problem: [place] Screening:  Goal: Serum bilirubin within specified parameters  Description: Serum bilirubin within specified parameters  2476 [id number] by Lydia Sears RN  Outcome: Ongoing  Note: TCB to be completed prior to discharge;  Mother verbalizes knowledge on assessing infant for jaundice       Problem: [place] Screening:  Goal: Circulatory function within specified parameters  Description: Circulatory function within specified parameters   6733 by Lydia Sears RN  Outcome: Ongoing  Note: CCHD passed; infant remains appropriate for ethnicity, warm, and dry       Problem: Nutritional:  Goal: Knowledge of adequate nutritional intake and output  Description: Knowledge of adequate nutritional intake and output  9/28/2515 1325 by Urvashi Ramirez RN  Outcome: Ongoing  Note: Mother verbalizes understanding to feed infant every 2-4 hours on demand and monitor output. Plan of care discussed with mother and she contributes to goal setting and voices understanding of plan of care. The patient is a 89y Female complaining of weakness.

## 2021-02-24 NOTE — H&P ADULT - ASSESSMENT
89 year old female PMH of HTN, CKD III, PVD, advanced stage endometrial cancer diagnosed 1/21/21 presents to the ED for weakness. Pt daughter Bella Baker was contacted, [phone]. She reports that since her diagnosis in January pt has been rapidly declining. Pt hasn't eaten since Saturday and has taken in minimal liquids. Pt is currently working with hospice to setup home hospice. Reports since yesterday noticed discoloration to toes. Pt was moving throughout the night, and this morning noted discoloration to fingers, so EMS was called. Denies fever/chills, CP, abdominal pain, SOB, N/V/D. Nurse aide at home as well. 89 year old female PMH of HTN, CKD III, PVD, advanced stage endometrial cancer diagnosed 1/21/21 presents to the ED for weakness. Pt daughter Bella Baker was contacted, [phone]. She reports that since her diagnosis in January pt has been rapidly declining. Pt hasn't eaten since Saturday and has taken in minimal liquids. Pt is currently working with hospice to setup home hospice. Reports since yesterday noticed discoloration to toes. Pt was moving throughout the night, and this morning noted discoloration to fingers, so EMS was called. Denies fever/chills, CP, abdominal pain, SOB, N/V/D. Nurse aide at home as well.    Plan:  89 year old female PMH of HTN, CKD III, PVD, advanced stage ovarian cancer diagnosed 1/21/21 presents to the ED for weakness. Pt daughter Bella Baker was contacted, [phone]. She reports that since her diagnosis in January pt has been rapidly declining. Pt hasn't eaten since Saturday and has taken in minimal liquids. Pt is currently working with hospice to setup home hospice. Reports since yesterday noticed discoloration to toes. Pt was moving throughout the night, and this morning noted discoloration to fingers, so EMS was called. Denies fever/chills, CP, abdominal pain, SOB, N/V/D. Nurse aide at home as well.    Plan: Advanced ovarian cancer. Pain meds. IVF D5W for KHLOE on CKD III. Creatinine last admission was 1.30. Family agrees to Palliative Care eval in AM.     Discussed plan with daughter at [phone]. Was not willing to sign DNR at present time. Will start Eliquis 2.5 mg bid for DVT LE, CKD does not   allow Lovenox use. PVD is known condition, supportive care and pain meds.     Likely needs home hospice referral and DME at home.          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 Pt daughter Bella Baker was contacted, [phone]. She reports that since her diagnosis in January pt has been rapidly declining. Pt hasn't eaten since Saturday and has taken in minimal liquids. Pt is currently working with hospice to setup home hospice. Reports since yesterday noticed discoloration to toes. Pt was moving throughout the night, and this morning noted discoloration to fingers, so EMS was called. Denies fever/chills, CP, abdominal pain, SOB, N/V/D. Nurse aide at home as well.    Plan: Advanced GYN cancer. Pain meds. IVF D5W for KHLOE on CKD III. Creatinine last admission was 1.30. Family agrees to Palliative Care eval in AM.     Discussed plan with daughter at [phone]. Was not willing to sign DNR at present time. Will start Eliquis 2.5 mg bid for DVT LE, CKD does not   allow Lovenox use. PVD is known condition, supportive care and pain meds.     Likely needs home hospice referral and DME at home.

## 2021-02-24 NOTE — ED PROVIDER NOTE - MUSCULOSKELETAL, MLM
Spine appears normal, TTP to both feet plantar aspect, discoloration to all toes and decreased pedal pulses. Good femoral and radial pulses. No leg edema or calf tenderness.

## 2021-02-24 NOTE — H&P ADULT - NSHPLABSRESULTS_GEN_ALL_CORE
LABS:                        9.1    8.16  )-----------( 141      ( 24 Feb 2021 15:45 )             29.2     02-24    159<H>  |  129<H>  |  75<H>  ----------------------------<  154<H>  4.0   |  17<L>  |  2.12<H>    Ca    8.7      24 Feb 2021 13:24    TPro  6.8  /  Alb  1.8<L>  /  TBili  0.6  /  DBili  x   /  AST  49<H>  /  ALT  13  /  AlkPhos  76  02-24            RADIOLOGY & ADDITIONAL TESTS:

## 2021-02-24 NOTE — ED PROVIDER NOTE - CARE PLAN
Principal Discharge DX:	Failure to thrive in adult  Secondary Diagnosis:	Ovarian cancer, bilateral  Secondary Diagnosis:	CKD (chronic kidney disease) stage 3, GFR 30-59 ml/min  Secondary Diagnosis:	DVT (deep venous thrombosis)

## 2021-02-24 NOTE — ED PROVIDER NOTE - NEUROLOGICAL, MLM
Alert and oriented, no focal deficits, no motor or sensory deficits. Pt able to follow commands and speak in complete sentences.

## 2021-02-24 NOTE — ED PROVIDER NOTE - CLINICAL SUMMARY MEDICAL DECISION MAKING FREE TEXT BOX
pt presented today with known h/o end stage ovarian cancer, pt's daughter is currently in the process of setting up home hospice, however sent in for not eating and drinking, and purplish discoloration of her hands and feet, sono shows bilateral dvt, ivf for dehydration, hospice care did call in and aware pt is going to be admitted

## 2021-02-24 NOTE — H&P ADULT - NSHPPHYSICALEXAM_GEN_ALL_CORE
PHYSICAL EXAMINATION:  Vital Signs Last 24 Hrs  T(C): 36.6 (24 Feb 2021 15:40), Max: 36.6 (24 Feb 2021 15:40)  T(F): 97.9 (24 Feb 2021 15:40), Max: 97.9 (24 Feb 2021 15:40)  HR: 74 (24 Feb 2021 15:40) (74 - 81)  BP: 92/52 (24 Feb 2021 15:40) (92/52 - 114/64)  BP(mean): --  RR: 16 (24 Feb 2021 15:40) (16 - 20)  SpO2: 98% (24 Feb 2021 15:40) (98% - 98%)  CAPILLARY BLOOD GLUCOSE          GENERAL: NAD, well-groomed, well-developed  HEAD:  atraumatic, normocephalic  EYES: sclera anicteric  ENMT: mucous membranes moist  NECK: supple, No JVD  CHEST/LUNG: clear to auscultation bilaterally; no rales, rhonchi, or wheezing b/l  HEART: normal S1, S2  ABDOMEN: BS+, soft, ND, NT   EXTREMITIES:  pulses palpable; no clubbing, cyanosis, or edema b/l LEs  NEURO: awake, alert, interactive; moves all extremities  SKIN: no rashes or lesions PHYSICAL EXAMINATION:  Vital Signs Last 24 Hrs  T(C): 36.6 (24 Feb 2021 15:40), Max: 36.6 (24 Feb 2021 15:40)  T(F): 97.9 (24 Feb 2021 15:40), Max: 97.9 (24 Feb 2021 15:40)  HR: 74 (24 Feb 2021 15:40) (74 - 81)  BP: 92/52 (24 Feb 2021 15:40) (92/52 - 114/64)  BP(mean): --  RR: 16 (24 Feb 2021 15:40) (16 - 20)  SpO2: 98% (24 Feb 2021 15:40) (98% - 98%)  CAPILLARY BLOOD GLUCOSE          GENERAL: NAD, well-groomed, well-developed, seen in ER, tired, answers questions  HEAD:  atraumatic, normocephalic  EYES: sclera anicteric  ENMT: mucous membranes moist  NECK: supple, No JVD  CHEST/LUNG: clear to auscultation bilaterally; no rales, rhonchi, or wheezing b/l  HEART: normal S1, S2  ABDOMEN: BS+, soft, ND, NT   EXTREMITIES:  pulses palpable; cyanosis b/l LEs  NEURO: awake, alert, interactive; moves all extremities  SKIN: no rashes or lesions

## 2021-02-24 NOTE — ED PROVIDER NOTE - OBJECTIVE STATEMENT
89 year old female w/PMH of HTN, CKD, stage 3, PVD, end stage endometrial cancer diagnosed 1/21/21 presents to the ED for weakness. Pt daughter Bella Baker was contacted, [phone]. She reports that since her diagnosis in January pt has been rapidly declining. Pt hasn't eaten since Saturday and has taken in minimal liquids. Pt is currently working with hospice to setup home hospice care. Reports since yesterday noticed discoloration to toes. Pt was moving throughout the night, and this morning noted discoloration to fingers, so EMS was called. Denies fever/chills, CP, abdominal pain, SOB, N/V/D. Nurse aide at home as well.

## 2021-02-25 ENCOUNTER — TRANSCRIPTION ENCOUNTER (OUTPATIENT)
Age: 86
End: 2021-02-25

## 2021-02-25 VITALS
HEART RATE: 99 BPM | RESPIRATION RATE: 18 BRPM | OXYGEN SATURATION: 93 % | DIASTOLIC BLOOD PRESSURE: 81 MMHG | SYSTOLIC BLOOD PRESSURE: 124 MMHG | TEMPERATURE: 98 F

## 2021-02-25 DIAGNOSIS — Z51.5 ENCOUNTER FOR PALLIATIVE CARE: ICD-10-CM

## 2021-02-25 DIAGNOSIS — R20.9 UNSPECIFIED DISTURBANCES OF SKIN SENSATION: ICD-10-CM

## 2021-02-25 DIAGNOSIS — R19.00 INTRA-ABDOMINAL AND PELVIC SWELLING, MASS AND LUMP, UNSPECIFIED SITE: ICD-10-CM

## 2021-02-25 DIAGNOSIS — R53.2 FUNCTIONAL QUADRIPLEGIA: ICD-10-CM

## 2021-02-25 DIAGNOSIS — R53.83 OTHER FATIGUE: ICD-10-CM

## 2021-02-25 DIAGNOSIS — R62.7 ADULT FAILURE TO THRIVE: ICD-10-CM

## 2021-02-25 DIAGNOSIS — I82.403 ACUTE EMBOLISM AND THROMBOSIS OF UNSPECIFIED DEEP VEINS OF LOWER EXTREMITY, BILATERAL: ICD-10-CM

## 2021-02-25 DIAGNOSIS — C78.6 SECONDARY MALIGNANT NEOPLASM OF RETROPERITONEUM AND PERITONEUM: ICD-10-CM

## 2021-02-25 DIAGNOSIS — E43 UNSPECIFIED SEVERE PROTEIN-CALORIE MALNUTRITION: ICD-10-CM

## 2021-02-25 LAB
ANION GAP SERPL CALC-SCNC: 8 MMOL/L — SIGNIFICANT CHANGE UP (ref 5–17)
BUN SERPL-MCNC: 69 MG/DL — HIGH (ref 7–23)
CALCIUM SERPL-MCNC: 8.1 MG/DL — LOW (ref 8.5–10.1)
CHLORIDE SERPL-SCNC: 128 MMOL/L — HIGH (ref 96–108)
CO2 SERPL-SCNC: 21 MMOL/L — LOW (ref 22–31)
CREAT SERPL-MCNC: 2.03 MG/DL — HIGH (ref 0.5–1.3)
GLUCOSE SERPL-MCNC: 159 MG/DL — HIGH (ref 70–99)
HCT VFR BLD CALC: 28.4 % — LOW (ref 34.5–45)
HGB BLD-MCNC: 9 G/DL — LOW (ref 11.5–15.5)
MCHC RBC-ENTMCNC: 31.7 GM/DL — LOW (ref 32–36)
MCHC RBC-ENTMCNC: 32.4 PG — SIGNIFICANT CHANGE UP (ref 27–34)
MCV RBC AUTO: 102.2 FL — HIGH (ref 80–100)
NRBC # BLD: 0 /100 WBCS — SIGNIFICANT CHANGE UP (ref 0–0)
PLATELET # BLD AUTO: 121 K/UL — LOW (ref 150–400)
POTASSIUM SERPL-MCNC: 3.9 MMOL/L — SIGNIFICANT CHANGE UP (ref 3.5–5.3)
POTASSIUM SERPL-SCNC: 3.9 MMOL/L — SIGNIFICANT CHANGE UP (ref 3.5–5.3)
RBC # BLD: 2.78 M/UL — LOW (ref 3.8–5.2)
RBC # FLD: 18.7 % — HIGH (ref 10.3–14.5)
SODIUM SERPL-SCNC: 157 MMOL/L — HIGH (ref 135–145)
WBC # BLD: 6.62 K/UL — SIGNIFICANT CHANGE UP (ref 3.8–10.5)
WBC # FLD AUTO: 6.62 K/UL — SIGNIFICANT CHANGE UP (ref 3.8–10.5)

## 2021-02-25 PROCEDURE — 99223 1ST HOSP IP/OBS HIGH 75: CPT

## 2021-02-25 PROCEDURE — 99497 ADVNCD CARE PLAN 30 MIN: CPT | Mod: 25

## 2021-02-25 PROCEDURE — 99239 HOSP IP/OBS DSCHRG MGMT >30: CPT

## 2021-02-25 RX ORDER — HYDROMORPHONE HYDROCHLORIDE 2 MG/ML
0.5 INJECTION INTRAMUSCULAR; INTRAVENOUS; SUBCUTANEOUS EVERY 6 HOURS
Refills: 0 | Status: DISCONTINUED | OUTPATIENT
Start: 2021-02-25 | End: 2021-02-25

## 2021-02-25 RX ORDER — ASPIRIN/CALCIUM CARB/MAGNESIUM 324 MG
1 TABLET ORAL
Qty: 0 | Refills: 0 | DISCHARGE
Start: 2021-02-25

## 2021-02-25 RX ORDER — APIXABAN 2.5 MG/1
1 TABLET, FILM COATED ORAL
Qty: 0 | Refills: 0 | DISCHARGE
Start: 2021-02-25

## 2021-02-25 RX ORDER — LEVOTHYROXINE SODIUM 125 MCG
1 TABLET ORAL
Qty: 0 | Refills: 0 | DISCHARGE
Start: 2021-02-25

## 2021-02-25 RX ORDER — SODIUM CHLORIDE 9 MG/ML
1000 INJECTION, SOLUTION INTRAVENOUS
Refills: 0 | Status: DISCONTINUED | OUTPATIENT
Start: 2021-02-25 | End: 2021-02-25

## 2021-02-25 RX ORDER — SODIUM CHLORIDE 9 MG/ML
500 INJECTION INTRAMUSCULAR; INTRAVENOUS; SUBCUTANEOUS ONCE
Refills: 0 | Status: COMPLETED | OUTPATIENT
Start: 2021-02-25 | End: 2021-02-25

## 2021-02-25 RX ORDER — HYDROMORPHONE HYDROCHLORIDE 2 MG/ML
1 INJECTION INTRAMUSCULAR; INTRAVENOUS; SUBCUTANEOUS
Refills: 0 | Status: DISCONTINUED | OUTPATIENT
Start: 2021-02-25 | End: 2021-02-25

## 2021-02-25 RX ADMIN — APIXABAN 2.5 MILLIGRAM(S): 2.5 TABLET, FILM COATED ORAL at 06:11

## 2021-02-25 RX ADMIN — HYDROMORPHONE HYDROCHLORIDE 0.5 MILLIGRAM(S): 2 INJECTION INTRAMUSCULAR; INTRAVENOUS; SUBCUTANEOUS at 13:40

## 2021-02-25 RX ADMIN — SODIUM CHLORIDE 100 MILLILITER(S): 9 INJECTION, SOLUTION INTRAVENOUS at 12:10

## 2021-02-25 RX ADMIN — HYDROMORPHONE HYDROCHLORIDE 0.5 MILLIGRAM(S): 2 INJECTION INTRAMUSCULAR; INTRAVENOUS; SUBCUTANEOUS at 18:41

## 2021-02-25 RX ADMIN — SODIUM CHLORIDE 100 MILLILITER(S): 9 INJECTION, SOLUTION INTRAVENOUS at 06:11

## 2021-02-25 RX ADMIN — SODIUM CHLORIDE 500 MILLILITER(S): 9 INJECTION INTRAMUSCULAR; INTRAVENOUS; SUBCUTANEOUS at 01:01

## 2021-02-25 RX ADMIN — Medication 25 MICROGRAM(S): at 06:11

## 2021-02-25 NOTE — CONSULT NOTE ADULT - CONVERSATION DETAILS
spoke to pt's daughter Itzel regarding her mother's current medical state, reviewed her recent CT scans and ultrasounds. 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 They both agreed that they do not want Bella to suffer any longer and want to allow for a natural death.  A MOLST for DNR will be placed in chart. Care goals will focus on quality and comfort knowing time is limited.

## 2021-02-25 NOTE — DISCHARGE NOTE NURSING/CASE MANAGEMENT/SOCIAL WORK - PATIENT PORTAL LINK FT
You can access the FollowMyHealth Patient Portal offered by [facility] by registering at the following website: [email or web address]. By joining [employer]’s FollowMyHealth portal, you will also be able to view your health information using other applications (apps) compatible with our system.

## 2021-02-25 NOTE — CONSULT NOTE ADULT - PROBLEM SELECTOR RECOMMENDATION 5
poor intake   catabolic cancerous state  early satiety from abdominal fullness due to mass effect/ascites

## 2021-02-25 NOTE — SWALLOW BEDSIDE ASSESSMENT ADULT - SLP PERTINENT HISTORY OF CURRENT PROBLEM
as per DANIELLA de la torre pt coughed with small amt of thin liquid overnight and pt refused breakfast this AM

## 2021-02-25 NOTE — SWALLOW BEDSIDE ASSESSMENT ADULT - ORAL PHASE
severely reduced bolus manipulation/formation, pt did not move po trial into oropharynx. SLP cleared oral residue/Decreased anterior-posterior movement of the bolus/Delayed oral transit time

## 2021-02-25 NOTE — CONSULT NOTE ADULT - ATTENDING COMMENTS
Thank you for this consult, will follow with you  30 minutes of entire encounter spent discussing advance care plans

## 2021-02-25 NOTE — CONSULT NOTE ADULT - PROBLEM SELECTOR RECOMMENDATION 8
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

## 2021-02-25 NOTE — DISCHARGE NOTE PROVIDER - NSDCQMSTROKE_NEU_ALL_CORE
My Asthma Action Plan    Name: Giovanni Cedeno   YOB: 1963  Date: 7/21/2020   My doctor: Juan Antonio Pak PA-C   My clinic: [facility]        My Rescue Medicine:   Albuterol inhaler (Proair/Ventolin/Proventil HFA)  2-4 puffs EVERY 4 HOURS as needed. Use a spacer if recommended by your provider.   My Asthma Severity:   Intermittent / Exercise Induced  Know your asthma triggers:              GREEN ZONE   Good Control    I feel good    No cough or wheeze    Can work, sleep and play without asthma symptoms       Take your asthma control medicine every day.     1. If exercise triggers your asthma, take your rescue medication    15 minutes before exercise or sports, and    During exercise if you have asthma symptoms  2. Spacer to use with inhaler: If you have a spacer, make sure to use it with your inhaler             YELLOW ZONE Getting Worse  I have ANY of these:    I do not feel good    Cough or wheeze    Chest feels tight    Wake up at night   1. Keep taking your Green Zone medications  2. Start taking your rescue medicine:    every 20 minutes for up to 1 hour. Then every 4 hours for 24-48 hours.  3. If you stay in the Yellow Zone for more than 12-24 hours, contact your doctor.  4. If you do not return to the Green Zone in 12-24 hours or you get worse, start taking your oral steroid medicine if prescribed by your provider.           RED ZONE Medical Alert - Get Help  I have ANY of these:    I feel awful    Medicine is not helping    Breathing getting harder    Trouble walking or talking    Nose opens wide to breathe       1. Take your rescue medicine NOW  2. If your provider has prescribed an oral steroid medicine, start taking it NOW  3. Call your doctor NOW  4. If you are still in the Red Zone after 20 minutes and you have not reached your doctor:    Take your rescue medicine again and    Call 911 or go to the emergency room right away    See your regular doctor within 2 weeks of an  Emergency Room or Urgent Care visit for follow-up treatment.          Annual Reminders:  Meet with Asthma Educator,  Flu Shot in the Fall, consider Pneumonia Vaccination for patients with asthma (aged 19 and older).    Pharmacy: [id number] - [place] - [id number] AT [facility] [address]    Electronically signed by Juan Antonio Pak PA-C   Date: 07/21/20                    Asthma Triggers  How To Control Things That Make Your Asthma Worse    Triggers are things that make your asthma worse.  Look at the list below to help you find your triggers and   what you can do about them. You can help prevent asthma flare-ups by staying away from your triggers.      Trigger                                                          What you can do   Cigarette Smoke  Tobacco smoke can make asthma worse. Do not allow smoking in your home, car or around you.  Be sure no one smokes at a child s day care or school.  If you smoke, ask your health care provider for ways to help you quit.  Ask family members to quit too.  Ask your health care provider for a referral to Quit Plan to help you quit smoking, or call 5-278-204-PLAN.     Colds, Flu, Bronchitis  These are common triggers of asthma. Wash your hands often.  Don t touch your eyes, nose or mouth.  Get a flu shot every year.     Dust Mites  These are tiny bugs that live in cloth or carpet. They are too small to see. Wash sheets and blankets in hot water every week.   Encase pillows and mattress in dust mite proof covers.  Avoid having carpet if you can. If you have carpet, vacuum weekly.   Use a dust mask and HEPA vacuum.   Pollen and Outdoor Mold  Some people are allergic to trees, grass, or weed pollen, or molds. Try to keep your windows closed.  Limit time out doors when pollen count is high.   Ask you health care provider about taking medicine during allergy season.     Animal Dander  Some people are allergic to skin flakes, urine or  saliva from pets with fur or feathers. Keep pets with fur or feathers out of your home.    If you can t keep the pet outdoors, then keep the pet out of your bedroom.  Keep the bedroom door closed.  Keep pets off cloth furniture and away from stuffed toys.     Mice, Rats, and Cockroaches  Some people are allergic to the waste from these pests.   Cover food and garbage.  Clean up spills and food crumbs.  Store grease in the refrigerator.   Keep food out of the bedroom.   Indoor Mold  This can be a trigger if your home has high moisture. Fix leaking faucets, pipes, or other sources of water.   Clean moldy surfaces.  Dehumidify basement if it is damp and smelly.   Smoke, Strong Odors, and Sprays  These can reduce air quality. Stay away from strong odors and sprays, such as perfume, powder, hair spray, paints, smoke incense, paint, cleaning products, candles and new carpet.   Exercise or Sports  Some people with asthma have this trigger. Be active!  Ask your doctor about taking medicine before sports or exercise to prevent symptoms.    Warm up for 5-10 minutes before and after sports or exercise.     Other Triggers of Asthma  Cold air:  Cover your nose and mouth with a scarf.  Sometimes laughing or crying can be a trigger.  Some medicines and food can trigger asthma.        No

## 2021-02-25 NOTE — DISCHARGE NOTE PROVIDER - NSDCMRMEDTOKEN_GEN_ALL_CORE_FT
apixaban 2.5 mg oral tablet: 1 tab(s) orally 2 times a day  aspirin 81 mg oral delayed release tablet: 1 tab(s) orally once a day  levothyroxine 25 mcg (0.025 mg) oral tablet: 1 tab(s) orally once a day

## 2021-02-25 NOTE — SWALLOW BEDSIDE ASSESSMENT ADULT - SLP GENERAL OBSERVATIONS
pt seen bedside, alert and oriented to self pt seen bedside, alert and oriented to self. pt engaged with SLP for simple yes/no questions during assessment and she was able to follow one step directions for oral care and feeding. noted hoarse/strained vocal quality with decreased articulation/volume. pt presented with overall weakness

## 2021-02-25 NOTE — SWALLOW BEDSIDE ASSESSMENT ADULT - H & P REVIEW
89 year old female PMH of HTN, CKD III, PVD, advanced stage GYN cancer diagnosed 1/21/21 presents to the ED for weakness. Pt daughter Bella Baker was contacted, [phone]. She reports that since her diagnosis in January pt has been rapidly declining. Pt hasn't eaten since Saturday and has taken in minimal liquids. Pt is currently working with hospice to setup home hospice. Reports since yesterday noticed discoloration to toes. Pt was moving throughout the night, and this morning noted discoloration to fingers, so EMS was called. Denies fever/chills, CP, abdominal pain, SOB, N/V/D. Nurse aide at home as well./yes

## 2021-02-25 NOTE — SWALLOW BEDSIDE ASSESSMENT ADULT - SWALLOW EVAL: DIAGNOSIS
pt presented with severe oropharyngeal dysphagia marked by overall weakness, decreased labial seal, poor bolus manipulation/formation with reduced AP transport, pt did not move bolus into oropharynx- SLP cleared residue from oral cavity. pt not safe for po diet at this time at high riak for aspiration, malnutrition and dehydration

## 2021-02-25 NOTE — DISCHARGE NOTE PROVIDER - NSDCCPCAREPLAN_GEN_ALL_CORE_FT
PRINCIPAL DISCHARGE DIAGNOSIS  Diagnosis: Acute metabolic encephalopathy  Assessment and Plan of Treatment:       SECONDARY DISCHARGE DIAGNOSES  Diagnosis: Severe dehydration  Assessment and Plan of Treatment:     Diagnosis: Acute hypernatremia  Assessment and Plan of Treatment:     Diagnosis: Functional quadriplegia  Assessment and Plan of Treatment: Functional quadriplegia    Diagnosis: Severe protein-calorie malnutrition  Assessment and Plan of Treatment: Severe protein-calorie malnutrition    Diagnosis: Acute deep vein thrombosis (DVT) of both lower extremities, unspecified vein  Assessment and Plan of Treatment: Acute deep vein thrombosis (DVT) of both lower extremities, unspecified vein    Diagnosis: Carcinomatosis peritonei  Assessment and Plan of Treatment: Carcinomatosis peritonei    Diagnosis: Pelvic mass  Assessment and Plan of Treatment: Pelvic mass    Diagnosis: CKD (chronic kidney disease) stage 3, GFR 30-59 ml/min  Assessment and Plan of Treatment:

## 2021-02-25 NOTE — CONSULT NOTE ADULT - SUBJECTIVE AND OBJECTIVE BOX
HPI:  89 year old female PMH of HTN, CKD III, PVD, advanced stage GYN cancer diagnosed 1/21/21 presents to the ED for weakness. Pt daughter Bella Baker was contacted, [phone]. She reports that since her diagnosis in January pt has been rapidly declining. Pt hasn't eaten since Saturday and has taken in minimal liquids. Pt is currently working with hospice to setup home hospice. Reports since yesterday noticed discoloration to toes. Pt was moving throughout the night, and this morning noted discoloration to fingers, so EMS was called. Denies fever/chills, CP, abdominal pain, SOB, N/V/D. Nurse aide at home as well. (24 Feb 2021 16:55)    PERTINENT PM/SXH:   PVD (peripheral vascular disease)    CKD (chronic kidney disease) stage 3, GFR 30-59 ml/min    HTN (hypertension), benign      Status post hip replacement, unspecified laterality      FAMILY HISTORY:  No pertinent family history in first degree relatives        SOCIAL HISTORY:   Significant other/partner: Yes [ ]  No [ ] Children:  Yes [ ]  No [ ] Christian/Spirituality:  Substance hx: Yes[ ]  No [ ]   Tobacco hx:  Yes [ ] No [ ]   Alcohol hx: Yes [ ] No [ ]   Home Opioid hx:  Yes [ ] No [ ]  [ ] I-Stop Reference No:[id number]  Living Situation: [ ]Home  [ ]Long term care  [ ]Rehab [ ]Other    ADVANCE DIRECTIVES:    DNR  MOLST  Yes [ ] No [ ]  Living Will  Yes [ ]  No [ ]     [ ] Health Care Proxy(s)  [ ] Surrogate(s)  [ ] Guardian           Name(s): Phone Number(s):    BASELINE (I)ADL(s) (prior to admission):  [place]: [ ]Total  [ ] Moderate [ ]Dependent    Allergies    No Known Allergies    Intolerances    MEDICATIONS  (STANDING):  apixaban 2.5 milliGRAM(s) Oral two times a day  aspirin enteric coated 81 milliGRAM(s) Oral daily  dextrose 5%. 1000 milliLiter(s) (100 mL/Hr) IV Continuous <Continuous>  levothyroxine 25 MICROGram(s) Oral daily  polyethylene glycol 3350 17 Gram(s) Oral daily  senna 2 Tablet(s) Oral at bedtime    MEDICATIONS  (PRN):  morphine  - Injectable 2 milliGRAM(s) IV Push every 4 hours PRN Mild Pain (1 - 3)    PRESENT SYMPTOMS: [ ]Unable to obtain due to poor mentation   Source if other than patient:  [ ]Family   [ ]Team     Pain (Impact on QOL):    Location -   Severity -        Minimal acceptable level (0-10 scale):  Quality:   Onset:   Duration:                 Aggravating factors -  Relieving factors -  Radiation -    PAIN AD Score:     [email or web address]/painad.pdf (press ctrl +  left click to view)    Dyspnea:  Yes [ ] No [ ] - [ ]Mild [ ]Moderate [ ]Severe  Anxiety:    Yes [ ] No [ ] - [ ]Mild [ ]Moderate [ ]Severe  Fatigue:    Yes [ ] No [ ] - [ ]Mild [ ]Moderate [ ]Severe  Nausea:    Yes [ ] No [ ] - [ ]Mild [ ]Moderate [ ]Severe                         Loss of appetite: Yes [ ] No [ ] - [ ]Mild [ ]Moderate [ ]Severe             Constipation:  Yes [ ] No [ ] - [ ]Mild [ ]Moderate [ ]Severe  Grief: Yes [ ] No [ ]     Other Symptoms:  [ ]All other review of systems negative     Karnofsky Performance Score/Palliative Performance Status Version 2:         %    http://palliative.info/resource_material/PPSv2.pdf    PHYSICAL EXAM:  Vital Signs Last 24 Hrs  T(C): 36.5 (25 Feb 2021 06:16), Max: 36.8 (24 Feb 2021 18:24)  T(F): 97.7 (25 Feb 2021 06:16), Max: 98.2 (24 Feb 2021 18:24)  HR: 86 (25 Feb 2021 06:16) (74 - 102)  BP: 93/45 (25 Feb 2021 06:16) (86/55 - 114/64)  BP(mean): --  RR: 17 (25 Feb 2021 06:16) (16 - 20)  SpO2: 96% (25 Feb 2021 06:16) (93% - 98%) I&O's Summary      GENERAL:  [ ]Alert  [ ]Oriented x   [ ]Lethargic  [ ]Cachexia  [ ]Unarousable  [ ]Verbal  [ ]Non-Verbal  Behavioral:   [ ] Anxiety  [ ] Delirium [ ] Agitation [ ] Other  HEENT:  [ ]Normal   [ ]Dry mouth   [ ]ET Tube/Trach  [ ]Oral lesions  PULMONARY:   [ ]Clear  [ ]Tachypnea  [ ]Audible excessive secretions   [ ]Rhonchi        [ ]Right [ ]Left [ ]Bilateral  [ ]Crackles        [ ]Right [ ]Left [ ]Bilateral  [ ]Wheezing     [ ]Right [ ]Left [ ]Bilateral  CARDIOVASCULAR:    [ ]Regular [ ]Irregular [ ]Tachy  [ ]Yrn [ ]Murmur [ ]Other  GASTROINTESTINAL:  [ ]Soft  [ ]Distended   [ ]+BS  [ ]Non tender [ ]Tender  [ ]PEG [ ]OGT/ NGT  Last BM:   GENITOURINARY:  [ ]Normal [ ] Incontinent   [ ]Oliguria/Anuria   [ ][place]  MUSCULOSKELETAL:   [ ]Normal   [ ]Weakness  [ ]Bed/Wheelchair bound [ ]Edema  NEUROLOGIC:   [ ]No focal deficits  [ ] Cognitive impairment  [ ] Dysphagia [ ]Dysarthria [ ] Paresis [ ]Other   SKIN:   [ ]Normal   [ ]Pressure ulcer(s)  [ ]Rash    LABS:                        9.0    6.62  )-----------( 121      ( 25 Feb 2021 08:38 )             28.4   02-24    159<H>  |  129<H>  |  75<H>  ----------------------------<  154<H>  4.0   |  17<L>  |  2.12<H>    Ca    8.7      24 Feb 2021 13:24    TPro  6.8  /  Alb  1.8<L>  /  TBili  0.6  /  DBili  x   /  AST  49<H>  /  ALT  13  /  AlkPhos  76  02-24        RADIOLOGY & ADDITIONAL STUDIES:    PROTEIN CALORIE MALNUTRITION PRESENT: [ ] Yes [ ] No  [ ] PPSV2 < or = to 30% [ ] significant weight loss  [ ] poor nutritional intake [ ] catabolic state [ ] anasarca     Albumin, Serum: 1.8 g/dL (02-24-21 @ 13:24)      REFERRALS:   [ ]Chaplaincy  [ ] Hospice  [ ]Child Life  [ ]Social Work  [ ]Case management [ ]Holistic Therapy   Goals of Care Discussion Document: RBAN Hernández (01-28-21 @ 14:49)  Goals of Care Conversation:   Participants   · Participants  Patient    Advance Directives   · Does patient have Advance Directive  No  · Do you want to complete the HCP and name a Deny Care Agent  Yes  · Name of person who assisted  Aleja Tamayo RN  · Caregiver:  declines    Conversation Discussion   · Conversation  MOLST Discussed; Palliative Care Referral  · Conversation Details  Writer met  with  pt at bedside. Reviewed patient's medical and social history as well as events leading to patient's hospitalization. Writer discussed patient's current diagnosis (pelvic mass,malignant neoplasm,PVD,CKD,HTN,Anemia,),  medical condition and management,  prognosis, and life expectancy. Inquired about patient's wishes regarding extent of medical care to be provided including escalation of medical care into the ICU and use of vasopressor support. In addition, the writer inquired about thoughts regarding cardiopulmnary resuscitation, artificial nutrition and hydration including use of feeding tubes and IVF, antibiotics, and further investigative studies such as blood draws and radiology.Pt. showed good insight into medical condition. All questions answered. Writer recommended complete HCP and discuss her wishes with her family.Pt wants resuscitation she doesn't want to live on machines. Psychosocial support provided.Spoke with pt about hospice care at home she will consider.    Location of Discussion:   Duration of Advanced Care Planning Meeting   · Time spent (in minutes)  25    Location of Discussion   · Location of discussion  Telephone    Electronic Signatures for Addendum Section:   Clemencia Morales) (Signed Addendum 29-Jan-2021 12:39)    I attest that the writer and palliative care team RN discussed pt's current medical condition, hospital stay, prognosis, disease trajectory, and life expectancy. (refer to ACP/GOC note from palliative care team RN for more details on conversation).    Electronic Signatures:  Clemencia Morales)  (Signed 29-Jan-2021 12:39)  	Co-Signer: Goals of Care Conversation, Location of Discussion  Aleja Tamayo)  (Signed 28-Jan-2021 15:05)  	Authored: Goals of Care Conversation, Location of Discussion      Last Updated: 29-Jan-2021 12:39 by Clemencia Morales)     HPI:  89 year old female PMH of HTN, CKD III, PVD, advanced stage GYN cancer diagnosed 1/21/21 presents to the ED for weakness. Pt daughter Bella Baker was contacted, [phone]. She reports that since her diagnosis in January pt has been rapidly declining. Pt hasn't eaten since Saturday and has taken in minimal liquids. Pt is currently working with hospice to setup home hospice. Reports since yesterday noticed discoloration to toes. Pt was moving throughout the night, and this morning noted discoloration to fingers, so EMS was called. Denies fever/chills, CP, abdominal pain, SOB, N/V/D. Nurse aide at home as well. (24 Feb 2021 16:55)    PERTINENT PM/SXH:   PVD (peripheral vascular disease)    CKD (chronic kidney disease) stage 3, GFR 30-59 ml/min    HTN (hypertension), benign      Status post hip replacement, unspecified laterality      FAMILY HISTORY:  No pertinent family history in first degree relatives        SOCIAL HISTORY:   Significant other/partner: Yes [ ]  No [ ] Children:  Yes [ ]  No [ ] Uatsdin/Spirituality:  Substance hx: Yes[ ]  No [ ]   Tobacco hx:  Yes [ ] No [ ]   Alcohol hx: Yes [ ] No [ ]   Home Opioid hx:  Yes [ ] No [ ]  [ ] I-Stop Reference No:[id number]  Living Situation: [ ]Home  [ ]Long term care  [ ]Rehab [ ]Other    ADVANCE DIRECTIVES:    DNR  MOLST  Yes [ ] No [ ]  Living Will  Yes [ ]  No [ ]     [ ] Health Care Proxy(s)  [ ] Surrogate(s)  [ ] Guardian           Name(s): Phone Number(s):    BASELINE (I)ADL(s) (prior to admission):  [place]: [ ]Total  [ ] Moderate [ ]Dependent    Allergies    No Known Allergies    Intolerances    MEDICATIONS  (STANDING):  apixaban 2.5 milliGRAM(s) Oral two times a day  aspirin enteric coated 81 milliGRAM(s) Oral daily  dextrose 5%. 1000 milliLiter(s) (100 mL/Hr) IV Continuous <Continuous>  levothyroxine 25 MICROGram(s) Oral daily  polyethylene glycol 3350 17 Gram(s) Oral daily  senna 2 Tablet(s) Oral at bedtime    MEDICATIONS  (PRN):  morphine  - Injectable 2 milliGRAM(s) IV Push every 4 hours PRN Mild Pain (1 - 3)    PRESENT SYMPTOMS: [x ]Unable to obtain due to poor mentation   Source if other than patient:  [ ]Family   [ ]Team     Pain (Impact on QOL):    Location -   Severity -        Minimal acceptable level (0-10 scale):  Quality:   Onset:   Duration:                 Aggravating factors -  Relieving factors -  Radiation -    PAIN AD Score:     http://geriatrictoolkit.[facility]/cog/painad.pdf (press ctrl +  left click to view)    Dyspnea:  Yes [ ] No [ ] - [ ]Mild [ ]Moderate [ ]Severe  Anxiety:    Yes [ ] No [ ] - [ ]Mild [ ]Moderate [ ]Severe  Fatigue:    Yes [ ] No [ ] - [ ]Mild [ ]Moderate [ ]Severe  Nausea:    Yes [ ] No [ ] - [ ]Mild [ ]Moderate [ ]Severe                         Loss of appetite: Yes [ ] No [ ] - [ ]Mild [ ]Moderate [ ]Severe             Constipation:  Yes [ ] No [ ] - [ ]Mild [ ]Moderate [ ]Severe  Grief: Yes [ ] No [ ]     Other Symptoms:  [x ]All other review of systems negative     Karnofsky Performance Score/Palliative Performance Status Version 2:        10 %    http://palliative.info/resource_material/PPSv2.pdf    PHYSICAL EXAM:  Vital Signs Last 24 Hrs  T(C): 36.5 (25 Feb 2021 06:16), Max: 36.8 (24 Feb 2021 18:24)  T(F): 97.7 (25 Feb 2021 06:16), Max: 98.2 (24 Feb 2021 18:24)  HR: 86 (25 Feb 2021 06:16) (74 - 102)  BP: 93/45 (25 Feb 2021 06:16) (86/55 - 114/64)  BP(mean): --  RR: 17 (25 Feb 2021 06:16) (16 - 20)  SpO2: 96% (25 Feb 2021 06:16) (93% - 98%) I&O's Summary      GENERAL:  [ ]Alert  [ ]Oriented x   [ x]Lethargic  [ x]Cachexia  [ ]Unarousable  [ ]Verbal  [ ]Non-Verbal  Behavioral:   [ ] Anxiety  [ ] Delirium [ ] Agitation [ ] Other  HEENT:  [ ]Normal   [ ]Dry mouth   [ ]ET Tube/Trach  [ ]Oral lesions  PULMONARY:   [ ]Clear  [ ]Tachypnea  [ ]Audible excessive secretions   [ ]Rhonchi        [ ]Right [ ]Left [ ]Bilateral  [ ]Crackles        [ ]Right [ ]Left [ ]Bilateral  [ ]Wheezing     [ ]Right [ ]Left [ ]Bilateral  CARDIOVASCULAR:    [ ]Regular [ ]Irregular [ ]Tachy  [ ]Yrn [ ]Murmur [ ]Other  GASTROINTESTINAL:  [ ]Soft  [ ]Distended   [ ]+BS  [ ]Non tender [ ]Tender  [ ]PEG [ ]OGT/ NGT  Last BM:   GENITOURINARY:  [ ]Normal [ ] Incontinent   [ ]Oliguria/Anuria   [ ][place]  MUSCULOSKELETAL:   [ ]Normal   [ ]Weakness  [ ]Bed/Wheelchair bound [ ]Edema  NEUROLOGIC:   [ ]No focal deficits  [ ] Cognitive impairment  [ ] Dysphagia [ ]Dysarthria [ ] Paresis [ ]Other   SKIN:   [ ]Normal   [ ]Pressure ulcer(s)  [ ]Rash    LABS:                        9.0    6.62  )-----------( 121      ( 25 Feb 2021 08:38 )             28.4   02-24    159<H>  |  129<H>  |  75<H>  ----------------------------<  154<H>  4.0   |  17<L>  |  2.12<H>    Ca    8.7      24 Feb 2021 13:24    TPro  6.8  /  Alb  1.8<L>  /  TBili  0.6  /  DBili  x   /  AST  49<H>  /  ALT  13  /  AlkPhos  76  02-24        RADIOLOGY & ADDITIONAL STUDIES: < from: US Duplex Venous Lower Ext Complete, Bilateral (02.24.21 @ 15:40) >    EXAM:  US DPLX LWR EXT VEINS COMPL BI                            PROCEDURE DATE:  02/24/2021          INTERPRETATION:  CLINICAL INFORMATION: Pain in legs    COMPARISON: None available.    TECHNIQUE: Duplex sonography of the BILATERAL LOWER extremity veins with color and spectral Doppler, with and without compression.    FINDINGS:  Echogenic acute thrombus within noncompressible right popliteal and peroneal veins, and in the left femoral and peroneal veins. The remainder the major deep veins bilaterally are patent without thrombus.    Impression: Positive for acute DVT bilaterally as discussed.    Discussed with Dr. Brown prior to this dictation                ARELY TORRES MD; Attending Radiologist  This document has been electronically signed. Feb 24 2021  3:52PM    < end of copied text >  < from: US Duplex Arterial Lower Ext Compl, Bilateral (02.24.21 @ 15:27) >    EXAM:  US DPLX LWR EXT ARTS COMPL BI                            PROCEDURE DATE:  02/24/2021          INTERPRETATION:  CLINICAL INFORMATION:  Peripheral arterial disease    TECHNIQUE: Color and spectral Doppler examination was performed on the bilateral lower extremity arteries.    FINDINGS:    There are atherosclerotic calcifications of the lower extremity arteries.    There is occlusion of the distal left superficial femoral and left popliteal arteries with distal reconstitution.    The bilateral external iliac, bilateral common femoral, bilateral deep femoral, right superficial femoral and right popliteal arteries are patent.    There is occlusion of the right posterior tibial and left dorsalis pedis arteries.    The left posterior tibial, bilateral peroneal, bilateral anterior tibial and right dorsalis pedis arteries are patent. Diminished flow is noted in the right dorsalis pedis artery.    IMPRESSION:    Occlusion of distal left superficial femoral and left popliteal arteries with distal reconstitution.  Occlusion of the right posterior tibial and left dorsalis pedis arteries.            ELISABET BRADFORD MD; Attending Radiologist  This document has been electronically signed. Feb 24 2021  3:33PM    < end of copied text >      PROTEIN CALORIE MALNUTRITION PRESENT: [x ] Yes [ ] No  [x ] PPSV2 < or = to 30% [x ] significant weight loss  [x ] poor nutritional intake [ x] catabolic state [ ] anasarca     Albumin, Serum: 1.8 g/dL (02-24-21 @ 13:24)      REFERRALS:   [ ]Chaplaincy  [ x] Hospice  [ ]Child Life  [x ]Social Work  [ ]Case management [ ]Holistic Therapy   Goals of Care Discussion Document: BRAN Hernández (01-28-21 @ 14:49)  Goals of Care Conversation:   Participants   · Participants  Patient    Advance Directives   · Does patient have Advance Directive  No  · Do you want to complete the HCP and name a Deny Care Agent  Yes  · Name of person who assisted  Aleja Tamayo RN  · Caregiver:  declines    Conversation Discussion   · Conversation  MOLST Discussed; Palliative Care Referral  · Conversation Details  Writer met  with  pt at bedside. Reviewed patient's medical and social history as well as events leading to patient's hospitalization. Writer discussed patient's current diagnosis (pelvic mass,malignant neoplasm,PVD,CKD,HTN,Anemia,),  medical condition and management,  prognosis, and life expectancy. Inquired about patient's wishes regarding extent of medical care to be provided including escalation of medical care into the ICU and use of vasopressor support. In addition, the writer inquired about thoughts regarding cardiopulmnary resuscitation, artificial nutrition and hydration including use of feeding tubes and IVF, antibiotics, and further investigative studies such as blood draws and radiology.Pt. showed good insight into medical condition. All questions answered. Writer recommended complete HCP and discuss her wishes with her family.Pt wants resuscitation she doesn't want to live on machines. Psychosocial support provided.Spoke with pt about hospice care at home she will consider.    Location of Discussion:   Duration of Advanced Care Planning Meeting   · Time spent (in minutes)  25    Location of Discussion   · Location of discussion  Telephone    Electronic Signatures for Addendum Section:   Clemencia Morales) (Signed Addendum 29-Jan-2021 12:39)    I attest that the writer and palliative care team RN discussed pt's current medical condition, hospital stay, prognosis, disease trajectory, and life expectancy. (refer to ACP/GOC note from palliative care team RN for more details on conversation).    Electronic Signatures:  Clemencia Morales)  (Signed 29-Jan-2021 12:39)  	Co-Signer: Goals of Care Conversation, Location of Discussion  Aleja Tamayo (RN)  (Signed 28-Jan-2021 15:05)  	Authored: Goals of Care Conversation, Location of Discussion      Last Updated: 29-Jan-2021 12:39 by Clemencia Morales)     HPI:  89 year old female PMH of HTN, CKD III, PVD, advanced stage GYN cancer diagnosed 1/21/21 presents to the ED for weakness. Pt daughter Mia Donnelly was contacted, [phone]. She reports that since her diagnosis in January pt has been rapidly declining. Pt hasn't eaten since Saturday and has taken in minimal liquids. Pt is currently working with hospice to setup home hospice. Reports since yesterday noticed discoloration to toes. Pt was moving throughout the night, and this morning noted discoloration to fingers, so EMS was called. Denies fever/chills, CP, abdominal pain, SOB, N/V/D. Nurse aide at home as well. (24 Feb 2021 16:55)    PERTINENT PM/SXH:   PVD (peripheral vascular disease)    CKD (chronic kidney disease) stage 3, GFR 30-59 ml/min    HTN (hypertension), benign      Status post hip replacement, unspecified laterality      FAMILY HISTORY:  No pertinent family history in first degree relatives        SOCIAL HISTORY:   Significant other/partner: Yes [ ]  No [x ] Children:  Yes [x ]  No [ ] Jainism/Spirituality: Spiritism   Substance hx: Yes[ ]  No [ x]   Tobacco hx:  Yes [ ] No [x ]   Alcohol hx: Yes [ ] No [ x]   Home Opioid hx:  Yes [ ] No [ ]  [ ] I-Stop Reference No:[id number]  Living Situation: [ x]Home  [ ]Long term care  [ ]Rehab [ ]Other    ADVANCE DIRECTIVES:    DNR  MOLST  Yes [ ] No [x ]  Living Will  Yes [ ]  No [ x]     [ ] Health Care Proxy(s)  [x ] Surrogate(s)  [ ] Guardian           Name(s): Phone Number(s):daughter Itzel Donnelly "Skyla" [phone]    BASELINE (I)ADL(s) (prior to admission):  [place]: [ ]Total  [ ] Moderate [ x]Dependent    Allergies    No Known Allergies    Intolerances    MEDICATIONS  (STANDING):  apixaban 2.5 milliGRAM(s) Oral two times a day  aspirin enteric coated 81 milliGRAM(s) Oral daily  dextrose 5%. 1000 milliLiter(s) (100 mL/Hr) IV Continuous <Continuous>  levothyroxine 25 MICROGram(s) Oral daily  polyethylene glycol 3350 17 Gram(s) Oral daily  senna 2 Tablet(s) Oral at bedtime    MEDICATIONS  (PRN):  morphine  - Injectable 2 milliGRAM(s) IV Push every 4 hours PRN Mild Pain (1 - 3)    PRESENT SYMPTOMS: [x ]Unable to obtain due to poor mentation   Source if other than patient:  [ ]Family   [ ]Team   pt nods to question of pain, not verbalizing other than moaning   Pain (Impact on QOL):    Location -   Severity -        Minimal acceptable level (0-10 scale):  Quality:   Onset:   Duration:                 Aggravating factors -  Relieving factors -  Radiation -    PAIN AD Score: 6    [email or web address]/painad.pdf (press ctrl +  left click to view)    Dyspnea:  Yes [ ] No [ ] - [ ]Mild [ ]Moderate [ ]Severe  Anxiety:    Yes [ ] No [ ] - [ ]Mild [ ]Moderate [ ]Severe  Fatigue:    Yes [ ] No [ ] - [ ]Mild [ ]Moderate [ ]Severe  Nausea:    Yes [ ] No [ ] - [ ]Mild [ ]Moderate [ ]Severe                         Loss of appetite: Yes [ ] No [ ] - [ ]Mild [ ]Moderate [ ]Severe             Constipation:  Yes [ ] No [ ] - [ ]Mild [ ]Moderate [ ]Severe  Grief: Yes [ ] No [ ]     Other Symptoms:  [x ]All other review of systems negative     Karnofsky Performance Score/Palliative Performance Status Version 2:        10 %    http://palliative.info/resource_material/PPSv2.pdf    PHYSICAL EXAM:  Vital Signs Last 24 Hrs  T(C): 36.5 (25 Feb 2021 06:16), Max: 36.8 (24 Feb 2021 18:24)  T(F): 97.7 (25 Feb 2021 06:16), Max: 98.2 (24 Feb 2021 18:24)  HR: 86 (25 Feb 2021 06:16) (74 - 102)  BP: 93/45 (25 Feb 2021 06:16) (86/55 - 114/64)  BP(mean): --  RR: 17 (25 Feb 2021 06:16) (16 - 20)  SpO2: 96% (25 Feb 2021 06:16) (93% - 98%) I&O's Summary      GENERAL:  [ ]Alert  [ ]Oriented x   [ x]Lethargic  [ x]Cachexia  [ ]Unarousable  [ ]Verbal  [ x]Non-Verbal  Behavioral:   [ ] Anxiety  [ ] Delirium [ ] Agitation [ ] Other  HEENT:  [ ]Normal   [x ]Dry mouth   [ ]ET Tube/Trach  [ ]Oral lesions  PULMONARY:   [ ]Clear  [ x]Tachypnea  [ ]Audible excessive secretions   [ ]Rhonchi        [ ]Right [ ]Left [ ]Bilateral  [ ]Crackles        [ ]Right [ ]Left [ ]Bilateral  [ ]Wheezing     [ ]Right [ ]Left [ ]Bilateral  CARDIOVASCULAR:    [ x]Regular [ ]Irregular [ ]Tachy  [ ]Yrn [ ]Murmur [ ]Other  GASTROINTESTINAL:  [ ]Soft  [ x]Distended   [x ]+BS  [ ]Non tender [x ]Tender diffuse  [ ]PEG [ ]OGT/ NGT  Last BM:   GENITOURINARY:  [ ]Normal [x ] Incontinent   [ ]Oliguria/Anuria   [ ][place]  MUSCULOSKELETAL:   [ ]Normal   [ ]Weakness  [x ]Bed/Wheelchair bound [ ]Edema  NEUROLOGIC:   [ ]No focal deficits  [ ] Cognitive impairment  [ ] Dysphagia [ ]Dysarthria [ ] Paresis [x ]Other -lethargic  SKIN:   [ ]Normal   [ x]Pressure ulcer(s) stage 2 sacrum  [ ]Rash    LABS:                        9.0    6.62  )-----------( 121      ( 25 Feb 2021 08:38 )             28.4   02-24    159<H>  |  129<H>  |  75<H>  ----------------------------<  154<H>  4.0   |  17<L>  |  2.12<H>    Ca    8.7      24 Feb 2021 13:24    TPro  6.8  /  Alb  1.8<L>  /  TBili  0.6  /  DBili  x   /  AST  49<H>  /  ALT  13  /  AlkPhos  76  02-24        RADIOLOGY & ADDITIONAL STUDIES: < from: US Duplex Venous Lower Ext Complete, Bilateral (02.24.21 @ 15:40) >    EXAM:  US DPLX LWR EXT VEINS COMPL BI                            PROCEDURE DATE:  02/24/2021          INTERPRETATION:  CLINICAL INFORMATION: Pain in legs    COMPARISON: None available.    TECHNIQUE: Duplex sonography of the BILATERAL LOWER extremity veins with color and spectral Doppler, with and without compression.    FINDINGS:  Echogenic acute thrombus within noncompressible right popliteal and peroneal veins, and in the left femoral and peroneal veins. The remainder the major deep veins bilaterally are patent without thrombus.    Impression: Positive for acute DVT bilaterally as discussed.    Discussed with Dr. Brown prior to this dictation                ARELY TORRES MD; Attending Radiologist  This document has been electronically signed. Feb 24 2021  3:52PM    < end of copied text >  < from: US Duplex Arterial Lower Ext Compl, Bilateral (02.24.21 @ 15:27) >    EXAM:  US DPLX LWR EXT ARTS COMPL BI                            PROCEDURE DATE:  02/24/2021          INTERPRETATION:  CLINICAL INFORMATION:  Peripheral arterial disease    TECHNIQUE: Color and spectral Doppler examination was performed on the bilateral lower extremity arteries.    FINDINGS:    There are atherosclerotic calcifications of the lower extremity arteries.    There is occlusion of the distal left superficial femoral and left popliteal arteries with distal reconstitution.    The bilateral external iliac, bilateral common femoral, bilateral deep femoral, right superficial femoral and right popliteal arteries are patent.    There is occlusion of the right posterior tibial and left dorsalis pedis arteries.    The left posterior tibial, bilateral peroneal, bilateral anterior tibial and right dorsalis pedis arteries are patent. Diminished flow is noted in the right dorsalis pedis artery.    IMPRESSION:    Occlusion of distal left superficial femoral and left popliteal arteries with distal reconstitution.  Occlusion of the right posterior tibial and left dorsalis pedis arteries.            ELISABET BRADFORD MD; Attending Radiologist  This document has been electronically signed. Feb 24 2021  3:33PM    < end of copied text >  < from: Xray Chest 1 View- PORTABLE-Urgent (Xray Chest 1 View- PORTABLE-Urgent .) (02.24.21 @ 12:55) >    EXAM:  XR CHEST PORTABLE URGENT 1V                            PROCEDURE DATE:  02/24/2021          INTERPRETATION:  AP semierect chest on February 24, 2021 at 12:35 PM. There is history of partial right upper lobectomy.    Mild left perihilar infiltrate proceeding to left base with associated fluid is again noted.    This is similar to January 26.    Slight right lower lung infiltrate on January 26 has cleared.    IMPRESSION: Clear right lung findings. Persistent left lung findings.            SUSAN JASON MD; Attending Radiologist  This document has been electronically signed. Feb 24 2021  1:28PM    < end of copied text >  < from: CT Abdomen and Pelvis w/ IV Cont (01.26.21 @ 21:55) >  EXAM:  CT ABDOMEN AND PELVIS IC                          EXAM:  CT CHEST                            PROCEDURE DATE:  01/26/2021          INTERPRETATION:  CLINICAL INFORMATION: Unintentional weight loss, weakness, cachexia, evaluate for mass    COMPARISON: None.    PROCEDURE:  CT of the Chest, Abdomen and Pelvis was performed with intravenous contrast.  Intravenous contrast: 90 ml Omnipaque 350. 10 ml discarded.  Oral contrast: None.  Sagittal and coronal reformats were performed.    FINDINGS:  CHEST:  LUNGS AND LARGE AIRWAYS: Partial right upper lobectomy. Changes of prior granulomatous disease. Bibasilar atelectasis.  PLEURA: Small bilateral pleural effusions.  VESSELS: Atherosclerotic change of the thoracic aorta without aneurysm or dissection. Main pulmonary artery is enlarged. Coronary artery calcifications. Stent in the left anterior descending coronary artery.  HEART: Right atrial enlargement. No pericardial effusion.  MEDIASTINUM AND VANDANA: No enlarged lymph node measuring greaterthan 10 mm in short axis. There is a small hiatal hernia containing loculated ascites.  CHEST WALL AND LOWER NECK: No acute finding    ABDOMEN AND PELVIS:  LIVER: Within normal limits.  BILE DUCTS: Mild to moderate intrahepatic biliary ductal dilatation. Extrahepatic biliary dilatation, common duct measures up to 15 mm.  GALLBLADDER: Distended with cholelithiasis.  SPLEEN: Within normal limits.  PANCREAS: Borderline mild pancreatic ductal dilatation. No pancreatic mass identified on this scan.  ADRENALS: Within normal limits.  KIDNEYS/URETERS: Symmetric renal cortical atrophy.    BLADDER: Decompressed, limiting evaluation.  REPRODUCTIVE ORGANS: Large, infiltrative cystic and solid mass in the pelvis, encompasses the uterus and adnexa, overallmeasures 11.9 x 9.6 x 5.9 cm. This mass is inseparable from multiple loops of small and large bowel.    BOWEL: No bowel obstruction. Appendix is not well seen, no secondary findings of acute appendicitis.  PERITONEUM: Diffuse, minimally complex and partially loculated ascites with extensive peritoneal carcinomatosis. 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  VESSELS: Atherosclerotic change of the aortoiliac arteries without aneurysm. Atherosclerotic change of the superior mesenteric artery origin.  RETROPERITONEUM/LYMPH NODES: No enlarged lymph node measuring greater than 10 mm in short axis  ABDOMINAL WALL: Enhancing metastatic implants in the rectus abdominis.  BONES: Right total hip arthroplasty    IMPRESSION:    1. Very large infiltrative cystic and solid mass in the pelvis, with extensive peritoneal carcinomatosis and large volume diffuse ascites. 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  2. Small bilateral pleural effusions.              SHARLA PONCE; Attending Radiologist  This document has been electronically signed. Jan 26 2021 11:05PM    < end of copied text >  Cytopathology - Non Gyn Report (01.29.21 @ 08:20)    Cytopathology - Non Gyn Report:   ACCESSION No:  [id number]    MIA DONNELLY        Cytopathology Report            Specimen(s) Submitted  Ascites      Clinical History  CT of abdomen and pelvis with contrast showed a large pelvic mass  with extensive peritoneal carcinomatosis and large amount of  ascites  Procedure:  Ultrasound guided paracentesis      Gross Description  80 ml cloudy greenish fluid in 50% cytolyt      Final Diagnosis  SUSPICIOUS FOR MALIGNANCY.  Scant clusters of highly atypical cells, strongly suspicious for  malignancy (see note).    Note:  Insufficient diagnostic material is present, precluding  further evaluation as to primary site of origin by  immunohistochemical stains. Recommend repeat if fluid re-  accumulates, and submission of all fluid  obtained.    Screened by: EZIO GUAN  Verified by: EZIO GUAN  (Electronic Signature)  Reported on: 02/01/21 11:36 [facility], [address] [place] [address]  Phone: [phone]   Fax: [phone]  _________________________________________________________________      Comment  Key portions of this case were reviewed in intradepartmental  consultation with Dr. SLIME Amos, with concurrence of diagnosis.        PROTEIN CALORIE MALNUTRITION PRESENT: [x ] Yes [ ] No  [x ] PPSV2 < or = to 30% [x ] significant weight loss  [x ] poor nutritional intake [ x] catabolic state [ ] anasarca     Albumin, Serum: 1.8 g/dL (02-24-21 @ 13:24)      REFERRALS:   [ ]Chaplaincy  [ x] Hospice  [ ]Child Life  [x ]Social Work  [ ]Case management [ ]Holistic Therapy   Goals of Care Discussion Document: BRAN Hernández (01-28-21 @ 14:49)  Goals of Care Conversation:   Participants   · Participants  Patient    Advance Directives   · Does patient have Advance Directive  No  · Do you want to complete the HCP and name a Deny Care Agent  Yes  · Name of person who assisted  Aleja Tamayo RN  · Caregiver:  declines    Conversation Discussion   · Conversation  MOLST Discussed; Palliative Care Referral  · Conversation Details  Writer met  with  pt at bedside. Reviewed patient's medical and social history as well as events leading to patient's hospitalization. Writer discussed patient's current diagnosis (pelvic mass,malignant neoplasm,PVD,CKD,HTN,Anemia,),  medical condition and management,  prognosis, and life expectancy. Inquired about patient's wishes regarding extent of medical care to be provided including escalation of medical care into the ICU and use of vasopressor support. In addition, the writer inquired about thoughts regarding cardiopulmnary resuscitation, artificial nutrition and hydration including use of feeding tubes and IVF, antibiotics, and further investigative studies such as blood draws and radiology.Pt. showed good insight into medical condition. All questions answered. Writer recommended complete HCP and discuss her wishes with her family.Pt wants resuscitation she doesn't want to live on machines. Psychosocial support provided.Spoke with pt about hospice care at home she will consider.    Location of Discussion:   Duration of Advanced Care Planning Meeting   · Time spent (in minutes)  25    Location of Discussion   · Location of discussion  Telephone    Electronic Signatures for Addendum Section:   Clemencia Morales) (Signed Addendum 29-Jan-2021 12:39)    I attest that the writer and palliative care team RN discussed pt's current medical condition, hospital stay, prognosis, disease trajectory, and life expectancy. (refer to ACP/GOC note from palliative care team RN for more details on conversation).    Electronic Signatures:  Clemencia Morales)  (Signed 29-Jan-2021 12:39)  	Co-Signer: Goals of Care Conversation, Location of Discussion  Aleja Tamayo)  (Signed 28-Jan-2021 15:05)  	Authored: Goals of Care Conversation, Location of Discussion      Last Updated: 29-Jan-2021 12:39 by Clemencia Morales)

## 2021-02-25 NOTE — CONSULT NOTE ADULT - PROBLEM SELECTOR RECOMMENDATION 9
arousable  not speaking only nodding   hypernatremic, hypotensive all could factor in as above -see GOC portion of note. Spoke with daughter Itzel and niece Michelle who are going to come to visit. They agree to comfort and would like hospice care moving forward. Pt appears to be inpatient appropriate at this time. Hospice has been in contact with Itzel as she was trying to arrange home hospice prior to admission here.

## 2021-02-25 NOTE — CONSULT NOTE ADULT - PROBLEM SELECTOR RECOMMENDATION 2
venous and arterial studies as above  h/o PVD   arterial occlusion bilateral lower extremities with discoloration of toes and pain

## 2021-02-25 NOTE — CONSULT NOTE ADULT - PROBLEM SELECTOR RECOMMENDATION 7
CT from January as above   pt had therapeutic paracentesis at that time   stage IV cancer unclear what type =pathology report above  likely coming from GYN source as mass involves uterus and adenexa   poor prognosis

## 2021-02-25 NOTE — CONSULT NOTE ADULT - ASSESSMENT
89 year old female PMH of HTN, CKD III, PVD, advanced stage GYN cancer diagnosed 1/21/21 admitted with weakness, cold/blue feet and hands, poor intake. Patient's daughter was in the process of setting up home hospice but grew increasingly concerned for her mother's condition and called 911. Palliative Care consulted for symptom management and GOC

## 2021-02-25 NOTE — SWALLOW BEDSIDE ASSESSMENT ADULT - COMMENTS
2/24/2021 INTERPRETATION:  AP semierect chest on February 24, 2021 at 12:35 PM. There is history of partial right upper lobectomy.  Mild left perihilar infiltrate proceeding to left base with associated fluid is again noted.This is similar to January 26.  Slight right lower lung infiltrate on January 26 has cleared.  IMPRESSION: Clear right lung findings. Persistent left lung findings.

## 2021-02-25 NOTE — HOSPICE CARE NOTE - CONVESATION DETAILS
Pt approved for In pt hospice. I spoke with the pt's dtr Itzel. We discussed In pt hospice services and she is agreeable to have pt transfer to the hospice [facility] today. I have emailed her the consents and pending their return. I spoke with the pt's primary nurse and requested she call the nurse at the [facility] to give verbal report. Dr. Purcell paged to make her aware of the above and to also request discharge orders. Dr. Urbano and LUANA Bangura were also made aware. Pt will transfer to the hospice [facility] today. [phone]  Will cont to f/u.     Mayra Villalobos Rn  [phone]

## 2021-02-25 NOTE — DISCHARGE NOTE PROVIDER - HOSPITAL COURSE
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 Pt daughter Bella Baker was contacted, [phone]. She reports that since her diagnosis in January pt has been rapidly declining. Pt hasn't eaten since Saturday and has taken in minimal liquids. Pt is currently working with hospice to setup home hospice. Reports since yesterday noticed discoloration to toes. Pt was moving throughout the night, and this morning noted discoloration to fingers, so EMS was called. Denies fever/chills, CP, abdominal pain, SOB, N/V/D. Nurse aide at home as well.    Plan: Advanced GYN cancer. Pain meds. Discussed plan with daughter at [phone]. Who signed DNR and agreed for inpt hospice. Will start Eliquis 2.5 mg bid for DVT LE, CKD does not   allow Lovenox use. PVD is known condition, supportive care and pain meds. Pt will be discharge to in-pt hospice

## 2021-02-28 LAB
SARS-COV-2 IGG SERPL QL IA: NEGATIVE — SIGNIFICANT CHANGE UP
SARS-COV-2 IGM SERPL IA-ACNC: <0.1 INDEX — SIGNIFICANT CHANGE UP

## 2021-03-12 DIAGNOSIS — Z96.649 PRESENCE OF UNSPECIFIED ARTIFICIAL HIP JOINT: ICD-10-CM

## 2021-03-12 DIAGNOSIS — G93.41 METABOLIC ENCEPHALOPATHY: ICD-10-CM

## 2021-03-12 DIAGNOSIS — Z95.5 PRESENCE OF CORONARY ANGIOPLASTY IMPLANT AND GRAFT: ICD-10-CM

## 2021-03-12 DIAGNOSIS — I12.9 HYPERTENSIVE CHRONIC KIDNEY DISEASE WITH STAGE 1 THROUGH STAGE 4 CHRONIC KIDNEY DISEASE, OR UNSPECIFIED CHRONIC KIDNEY DISEASE: ICD-10-CM

## 2021-03-12 DIAGNOSIS — R53.2 FUNCTIONAL QUADRIPLEGIA: ICD-10-CM

## 2021-03-12 DIAGNOSIS — C56.2 MALIGNANT NEOPLASM OF LEFT OVARY: ICD-10-CM

## 2021-03-12 DIAGNOSIS — I73.9 PERIPHERAL VASCULAR DISEASE, UNSPECIFIED: ICD-10-CM

## 2021-03-12 DIAGNOSIS — E86.0 DEHYDRATION: ICD-10-CM

## 2021-03-12 DIAGNOSIS — R18.8 OTHER ASCITES: ICD-10-CM

## 2021-03-12 DIAGNOSIS — C78.6 SECONDARY MALIGNANT NEOPLASM OF RETROPERITONEUM AND PERITONEUM: ICD-10-CM

## 2021-03-12 DIAGNOSIS — N18.30 CHRONIC KIDNEY DISEASE, STAGE 3 UNSPECIFIED: ICD-10-CM

## 2021-03-12 DIAGNOSIS — Z66 DO NOT RESUSCITATE: ICD-10-CM

## 2021-03-12 DIAGNOSIS — L89.152 PRESSURE ULCER OF SACRAL REGION, STAGE 2: ICD-10-CM

## 2021-03-12 DIAGNOSIS — R62.7 ADULT FAILURE TO THRIVE: ICD-10-CM

## 2021-03-12 DIAGNOSIS — Z74.01 BED CONFINEMENT STATUS: ICD-10-CM

## 2021-03-12 DIAGNOSIS — Z79.82 LONG TERM (CURRENT) USE OF ASPIRIN: ICD-10-CM

## 2021-03-12 DIAGNOSIS — Z51.5 ENCOUNTER FOR PALLIATIVE CARE: ICD-10-CM

## 2021-03-12 DIAGNOSIS — I82.403 ACUTE EMBOLISM AND THROMBOSIS OF UNSPECIFIED DEEP VEINS OF LOWER EXTREMITY, BILATERAL: ICD-10-CM

## 2021-03-12 DIAGNOSIS — C56.1 MALIGNANT NEOPLASM OF RIGHT OVARY: ICD-10-CM

## 2021-03-12 DIAGNOSIS — E43 UNSPECIFIED SEVERE PROTEIN-CALORIE MALNUTRITION: ICD-10-CM

## 2021-10-03 NOTE — ED PROVIDER NOTE - CPE EDP RESP NORM
Pediatric Electrophysiology Clinic Note     Referring Physician:  Sanna Pham MD  [address] [place] [address]  [phone]     RE: Amaury Billy    2015     Problem List:    Patient Active Problem List   Diagnosis   • Palpitations         History:   This note is in reference to the recent Pediatric Electrophysiology Clinic visit on 21 at [facility] for Amaury Billy, the 6 year old male with the above problem list.  He was accompanied by his father.     Amaury has had episodes of \"heart beeping\" that occur when eating and on the first day of school.  The last episode occurred about two weeks ago, and his parents cut back on his sugar intake.  He has been active with soccer.         A complete 10-point review of systems was otherwise negative.      Amaury's past medical history is summarized in the problem list above.    ALLERGIES:  No Known Allergies    Current Outpatient Medications   Medication Sig Dispense Refill   • mometasone (ELOCON) 0.1 % ointment Apply twice daily to affected areas for two weeks 45 g 2   • cetirizine (ZyrTEC) 5 MG/5ML solution Take 5 mLs by mouth daily. 236 mL 1   • fluticasone (Flonase) 50 MCG/ACT nasal spray Spray 1 spray in each nostril daily. For one month 16 g 12   • montelukast (Singulair) 5 MG chewable tablet Chew 1 tablet by mouth nightly. 30 tablet 2   • triamcinolone (ARISTOCORT) 0.1 % cream Apply to the skin area twice per day for 14 days 30 g 1   • hydroCORTisone (CORTIZONE) 2.5 % cream Apply 1 application topically 3 times daily. 15 g 2     No current facility-administered medications for this visit.       He lives in [place], [place].    There has been no change to the family history.        Physical Exam:    /64 (BP Location: RUE - Right upper extremity, Patient Position: Sitting, Cuff Size: Pediatric)   Pulse 110   Ht 4' 0.94\" (1.243 m)   Wt 27.4 kg (60 lb 6.5 oz)   BMI 17.73 kg/m²   BSA 0.97 m²     On physical  exam, he was an alert and pleasant boy in no distress.   A mask was in place due to the pandemic.  The neck was supple.  The lungs were clear to auscultation bilaterally.  The point of maximal impulse was normally placed, and there were no lifts, heaves, or thrills.  There was a normal S1 and a physiologically split S2.  There was a regular rate and rhythm, and there were no murmurs, gallops, or rubs.  The abdomen was soft and nontender, with no hepatomegaly.  The extremities were warm and well-perfused, and there was no edema.  The strength and tone were normal.  There were no rashes.           Laboratory Studies:  A 12-lead electrocardiogram was performed, and I reviewed and interpreted it; it demonstrated sinus rhythm with a rate of 97 beats per minute.  The MT, QRS, and QTc intervals were normal.  There was no evidence of chamber enlargement or ventricular hypertrophy.  There was no evidence of pre-excitation.      I reviewed the event monitor from 8/3/21 to 9/1/21, which demonstrated 46 transmissions: 18 were patient-triggered episodes with symptoms of fatigue, flutter, skipped beats, and rapid heart rates.  These corresponded to sinus rhythm and sinus tachycardia.     Assessment:    Amaury is a 6-year-old boy with palpitations and a recent event monitor that demonstrated no arrhythmia at the time of symptoms.  I spoke with Amaury and his father about these findings and provided reassurance.  If Amaury continues to have these symptoms in the future, I would be glad to see him back.  We may consider a longer term monitor.         Recommendations:  1.  There are no restrictions to activities for Amaury.  2.  I would be glad to see him back if there are future concerns.       Thank you for allowing me to participate in the care of Amaury.  Please do not hesitate to contact me if you have any questions.     Sincerely,    Loki Aguero MD  Pediatric Cardiology / Electrophysiology     normal...

## 2022-03-01 NOTE — CONSULT NOTE ADULT - FAMILY/CHILD(REN)
See Mychart message from today.  Patient has enough medication to get her through until VV 3/4.    Taylor De Los Santos RN     Itzel Baker

## 2022-04-21 NOTE — DISCHARGE NOTE PROVIDER - NSDCHHPTASSISTHOME_GEN_ALL_CORE
[facility] Patient Note      Subjective   Patient ID: Curtis is a 12 month old male.    Chief Complaint   Patient presents with   • Eye Problem     bilateral eye redness, swelling, gunk in eyes   • Cough     WET       HPI:  HPI   Patient brought in by parents with complaint of bilateral eye redness, discharge, congestion, runny nose, states symptoms started today.  Patient does go to , there has been cases of pinkeye at .  Mother denies any fevers, patient tolerating p.o. well, wetting diapers as per usual.  Mother declines need for RSV testing or COVID testing today.    History reviewed. No pertinent past medical history.    MEDICATIONS:  Current Outpatient Medications   Medication Sig   • erythromycin (ILOTYCIN) ophthalmic ointment Place 0.25 inches into both eyes every 6 hours for 7 days.     No current facility-administered medications for this visit.       ALLERGIES:  ALLERGIES:  No Known Allergies    PAST SURGICAL HISTORY:  History reviewed. No pertinent surgical history.    FAMILY HISTORY:  History reviewed. No pertinent family history.    SOCIAL HISTORY:  Social History     Tobacco Use   • Smoking status: Not on file   • Smokeless tobacco: Not on file   Substance Use Topics   • Alcohol use: Not on file   • Drug use: Not on file         Review of Systems   Constitutional: Negative.    HENT: Positive for congestion and rhinorrhea.    Eyes: Negative.    Respiratory: Negative.    Cardiovascular: Negative.    Gastrointestinal: Negative.    Endocrine: Negative.    Genitourinary: Negative.    Musculoskeletal: Negative.    Skin: Negative.    Allergic/Immunologic: Negative.    Neurological: Negative.    Hematological: Negative.    Psychiatric/Behavioral: Negative.        Objective     Physical Exam  Vitals reviewed.   Constitutional:       General: He is active.      Appearance: Normal appearance.   HENT:      Right Ear: Tympanic membrane normal.      Left Ear: Tympanic membrane normal.      Nose: Congestion  present.      Mouth/Throat:      Mouth: Mucous membranes are moist.      Pharynx: Oropharynx is clear.      Neck: Normal range of motion and neck supple.   Eyes:      General:         Right eye: Discharge present.         Left eye: Discharge present.     Extraocular Movements: Extraocular movements intact.      Pupils: Pupils are equal, round, and reactive to light.      Comments: Bilateral eye conjunctivitis   Cardiovascular:      Rate and Rhythm: Regular rhythm.      Pulses: Normal pulses.      Heart sounds: Normal heart sounds.   Pulmonary:      Effort: Pulmonary effort is normal.      Breath sounds: Normal breath sounds.   Abdominal:      General: Bowel sounds are normal.      Tenderness: There is no abdominal tenderness. There is no guarding.   Musculoskeletal:         General: No swelling or tenderness. Normal range of motion.   Skin:     General: Skin is warm and dry.      Coloration: Skin is not jaundiced.   Neurological:      General: No focal deficit present.      Mental Status: He is alert and oriented for age.       Visit Vitals  Pulse 133   Temp 97.9 °F (36.6 °C) (Tympanic)   Resp 30   Wt 9.3 kg (20 lb 8 oz)   SpO2 100%       Labs:  No results found for this visit on 04/20/22.    Imaging:  LAST X-RAY:             Procedures     Assessment   Problem List Items Addressed This Visit     None      Visit Diagnoses     Conjunctivitis of both eyes, unspecified conjunctivitis type    -  Primary    Relevant Medications    erythromycin (ILOTYCIN) ophthalmic ointment            MDM     This is a 12 month old  male who presents with congestion, runny nose, cough, bilateral eye discharge and redness.  On exam patient does have bilateral conjunctivitis, has potential exposure to pinkeye at .  Patient will be treated with erythromycin.  Mother educated on good eyelid hygiene, handwashing, supportive care.  Patient to follow-up with PCP to continue to monitor, educated on when to seek further immediate  evaluation if any labored breathing, not tolerating p.o. well, any fevers, or any other concern, mother voices understanding    Instructions provided as documented in the AVS.    Thank you for visiting [facility], CNP  4/20/2022    Patient Needs Assistance to Leave Residence...

## 2022-06-30 NOTE — ED ADULT NURSE NOTE - NSSUHOSCREENINGYN_ED_ALL_ED
Refer to the Assessment tab to view/cancel completed assessment. Yes - the patient is able to be screened
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
